# Patient Record
Sex: MALE | Race: WHITE | NOT HISPANIC OR LATINO | Employment: OTHER | URBAN - METROPOLITAN AREA
[De-identification: names, ages, dates, MRNs, and addresses within clinical notes are randomized per-mention and may not be internally consistent; named-entity substitution may affect disease eponyms.]

---

## 2022-01-13 NOTE — PROGRESS NOTES
Subjective   Patient ID: Frandy Perkins is a 81 y.o. male is being seen for consultation today at the request of Jessica Forrest MD for lumbar spinal stenosis.  MRI lumbar/xray lumbar done on 4/16/21.    Patient reports today low back pain that radiates down both legs to his feet.    The patient has a long and chronic history of pain dating back to 10 years in his back and his legs.  He underwent 3 surgeries, all by Dr. Damon.  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  He said he recalls being weak in both legs and having back pain and he thinks the surgery helped him until about 3 years later when apparently he went on to have a decompression and fusion at L2-L3.  This only helped for about a year and then he had another left L5-S1 decompression.  That was in 2017 and he has not seen Dr. Damon since.  He has mostly back pain and some hip and anterior thigh pain.  He has to lean forward when he walks and he has a positive shopping cart sign consistent with neurogenic claudication.  He has not had any recent postoperative studies.  He has not seen Dr. Damon since 2017.  He used to work with Dr. Brown before he ever had surgery and had blocks.  He used to be seen at [facility] pain management before and shut down.  He is not taking narcotic medications.  He is not taking any blood thinners.  There is a report from an MRI done in 2015 of some arachnoiditis but there is really nothing new to review.  I told him in order for me to give my opinion I need to get a recent set of pictures.  So I recommended a myelogram of the lumbar spine and flexion-extension films with follow-up with me in the office to discuss the findings.  He has had epidural blocks before but never an ablation and that might be something to consider particularly if his back is bothering him the most.    We will discuss these issues when he returns.    Back  Pain  The pain is present in the lumbar spine. The quality of the pain is described as aching. The pain radiates to the left knee, left foot, left thigh, right knee, right foot and right thigh. The pain is at a severity of 7/10. The pain is worse during the day. The symptoms are aggravated by standing (walking). Associated symptoms include leg pain, numbness and tingling. Pertinent negatives include no fever. He has tried bed rest and analgesics for the symptoms. The treatment provided mild relief.       The following portions of the patient's history were reviewed and updated as appropriate: allergies, current medications, past family history, past medical history, past social history, past surgical history and problem list.    Review of Systems   Constitutional: Negative for chills, fatigue and fever.   HENT: Negative for congestion.    Musculoskeletal: Positive for back pain.   Neurological: Positive for tingling and numbness.   All other systems reviewed and are negative.      Objective   Physical Exam  Constitutional:       Appearance: He is well-developed.   HENT:      Head: Normocephalic and atraumatic.   Eyes:      Extraocular Movements: EOM normal.      Conjunctiva/sclera: Conjunctivae normal.      Pupils: Pupils are equal, round, and reactive to light.   Neck:      Vascular: No carotid bruit.   Neurological:      Mental Status: He is oriented to person, place, and time.      Coordination: Finger-Nose-Finger Test and Heel to Shin Test normal.      Gait: Gait is intact.      Deep Tendon Reflexes:      Reflex Scores:       Tricep reflexes are 2+ on the right side and 2+ on the left side.       Bicep reflexes are 2+ on the right side and 2+ on the left side.       Brachioradialis reflexes are 2+ on the right side and 2+ on the left side.       Patellar reflexes are 2+ on the right side and 2+ on the left side.       Achilles reflexes are 2+ on the right side and 2+ on the left side.  Psychiatric:          Speech: Speech normal.       Neurologic Exam     Mental Status   Oriented to person, place, and time.   Registration of memory: Good recent and remote memory.   Attention: normal. Concentration: normal.   Speech: speech is normal   Level of consciousness: alert  Knowledge: consistent with education.     Cranial Nerves     CN II   Visual fields full to confrontation.   Visual acuity: normal    CN III, IV, VI   Pupils are equal, round, and reactive to light.  Extraocular motions are normal.     CN V   Facial sensation intact.   Right corneal reflex: normal  Left corneal reflex: normal    CN VII   Facial expression full, symmetric.   Right facial weakness: none  Left facial weakness: none    CN VIII   Hearing: intact    CN IX, X   Palate: symmetric    CN XI   Right sternocleidomastoid strength: normal  Left sternocleidomastoid strength: normal    CN XII   Tongue: not atrophic  Tongue deviation: none    Motor Exam   Muscle bulk: normal  Right arm tone: normal  Left arm tone: normal  Right leg tone: normal  Left leg tone: normal    Strength   Strength 5/5 except as noted.     Sensory Exam   Light touch normal.     Gait, Coordination, and Reflexes     Gait  Gait: normal    Coordination   Finger to nose coordination: normal  Heel to shin coordination: normal    Reflexes   Right brachioradialis: 2+  Left brachioradialis: 2+  Right biceps: 2+  Left biceps: 2+  Right triceps: 2+  Left triceps: 2+  Right patellar: 2+  Left patellar: 2+  Right achilles: 2+  Left achilles: 2+  Right : 2+  Left : 2+      Assessment/Plan   Independent Review of Radiographic Studies:      I reviewed plain x-rays done in 2019 which show pedicle screw fixation at L2-L3 and multilevel degenerative disc disease.  Agree with the report.      Medical Decision Making:      We will move forward with a lumbar myelogram with flexion-extension films.  This is the best way to get an accurate picture of the spine and the nerves.  He is aware of the risk  of a spinal headache and the potential need for blood patch.  We will discuss his options when he returns to discuss the study.      Diagnoses and all orders for this visit:    1. DDD (degenerative disc disease), lumbar (Primary)  -     IR Myelogram Lumbar Spine; Future  -     CT Lumbar Spine With Intrathecal Contrast; Future  -     XR Spine Lumbar Complete With Flex & Ext; Future    2. Spinal stenosis of lumbar region with neurogenic claudication  -     IR Myelogram Lumbar Spine; Future  -     CT Lumbar Spine With Intrathecal Contrast; Future  -     XR Spine Lumbar Complete With Flex & Ext; Future    3. History of spinal fusion  -     IR Myelogram Lumbar Spine; Future  -     CT Lumbar Spine With Intrathecal Contrast; Future  -     XR Spine Lumbar Complete With Flex & Ext; Future      Return in about 3 weeks (around 2/7/2022) for After myelogram and x-rays.

## 2022-01-13 NOTE — H&P (VIEW-ONLY)
Subjective   Patient ID: Frandy Perkins is a 81 y.o. male is being seen for consultation today at the request of Jessica Forrest MD for lumbar spinal stenosis.  MRI lumbar/xray lumbar done on 4/16/21.    Patient reports today low back pain that radiates down both legs to his feet.    The patient has a long and chronic history of pain dating back to 10 years in his back and his legs.  He underwent 3 surgeries, all by Dr. Damon.  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  He said he recalls being weak in both legs and having back pain and he thinks the surgery helped him until about 3 years later when apparently he went on to have a decompression and fusion at L2-L3.  This only helped for about a year and then he had another left L5-S1 decompression.  That was in 2017 and he has not seen Dr. Damon since.  He has mostly back pain and some hip and anterior thigh pain.  He has to lean forward when he walks and he has a positive shopping cart sign consistent with neurogenic claudication.  He has not had any recent postoperative studies.  He has not seen Dr. Damon since 2017.  He used to work with Dr. Brown before he ever had surgery and had blocks.  He used to be seen at [facility] pain management before and shut down.  He is not taking narcotic medications.  He is not taking any blood thinners.  There is a report from an MRI done in 2015 of some arachnoiditis but there is really nothing new to review.  I told him in order for me to give my opinion I need to get a recent set of pictures.  So I recommended a myelogram of the lumbar spine and flexion-extension films with follow-up with me in the office to discuss the findings.  He has had epidural blocks before but never an ablation and that might be something to consider particularly if his back is bothering him the most.    We will discuss these issues when he returns.    Back  Pain  The pain is present in the lumbar spine. The quality of the pain is described as aching. The pain radiates to the left knee, left foot, left thigh, right knee, right foot and right thigh. The pain is at a severity of 7/10. The pain is worse during the day. The symptoms are aggravated by standing (walking). Associated symptoms include leg pain, numbness and tingling. Pertinent negatives include no fever. He has tried bed rest and analgesics for the symptoms. The treatment provided mild relief.       The following portions of the patient's history were reviewed and updated as appropriate: allergies, current medications, past family history, past medical history, past social history, past surgical history and problem list.    Review of Systems   Constitutional: Negative for chills, fatigue and fever.   HENT: Negative for congestion.    Musculoskeletal: Positive for back pain.   Neurological: Positive for tingling and numbness.   All other systems reviewed and are negative.      Objective   Physical Exam  Constitutional:       Appearance: He is well-developed.   HENT:      Head: Normocephalic and atraumatic.   Eyes:      Extraocular Movements: EOM normal.      Conjunctiva/sclera: Conjunctivae normal.      Pupils: Pupils are equal, round, and reactive to light.   Neck:      Vascular: No carotid bruit.   Neurological:      Mental Status: He is oriented to person, place, and time.      Coordination: Finger-Nose-Finger Test and Heel to Shin Test normal.      Gait: Gait is intact.      Deep Tendon Reflexes:      Reflex Scores:       Tricep reflexes are 2+ on the right side and 2+ on the left side.       Bicep reflexes are 2+ on the right side and 2+ on the left side.       Brachioradialis reflexes are 2+ on the right side and 2+ on the left side.       Patellar reflexes are 2+ on the right side and 2+ on the left side.       Achilles reflexes are 2+ on the right side and 2+ on the left side.  Psychiatric:          Speech: Speech normal.       Neurologic Exam     Mental Status   Oriented to person, place, and time.   Registration of memory: Good recent and remote memory.   Attention: normal. Concentration: normal.   Speech: speech is normal   Level of consciousness: alert  Knowledge: consistent with education.     Cranial Nerves     CN II   Visual fields full to confrontation.   Visual acuity: normal    CN III, IV, VI   Pupils are equal, round, and reactive to light.  Extraocular motions are normal.     CN V   Facial sensation intact.   Right corneal reflex: normal  Left corneal reflex: normal    CN VII   Facial expression full, symmetric.   Right facial weakness: none  Left facial weakness: none    CN VIII   Hearing: intact    CN IX, X   Palate: symmetric    CN XI   Right sternocleidomastoid strength: normal  Left sternocleidomastoid strength: normal    CN XII   Tongue: not atrophic  Tongue deviation: none    Motor Exam   Muscle bulk: normal  Right arm tone: normal  Left arm tone: normal  Right leg tone: normal  Left leg tone: normal    Strength   Strength 5/5 except as noted.     Sensory Exam   Light touch normal.     Gait, Coordination, and Reflexes     Gait  Gait: normal    Coordination   Finger to nose coordination: normal  Heel to shin coordination: normal    Reflexes   Right brachioradialis: 2+  Left brachioradialis: 2+  Right biceps: 2+  Left biceps: 2+  Right triceps: 2+  Left triceps: 2+  Right patellar: 2+  Left patellar: 2+  Right achilles: 2+  Left achilles: 2+  Right : 2+  Left : 2+      Assessment/Plan   Independent Review of Radiographic Studies:      I reviewed plain x-rays done in 2019 which show pedicle screw fixation at L2-L3 and multilevel degenerative disc disease.  Agree with the report.      Medical Decision Making:      We will move forward with a lumbar myelogram with flexion-extension films.  This is the best way to get an accurate picture of the spine and the nerves.  He is aware of the risk  of a spinal headache and the potential need for blood patch.  We will discuss his options when he returns to discuss the study.      Diagnoses and all orders for this visit:    1. DDD (degenerative disc disease), lumbar (Primary)  -     IR Myelogram Lumbar Spine; Future  -     CT Lumbar Spine With Intrathecal Contrast; Future  -     XR Spine Lumbar Complete With Flex & Ext; Future    2. Spinal stenosis of lumbar region with neurogenic claudication  -     IR Myelogram Lumbar Spine; Future  -     CT Lumbar Spine With Intrathecal Contrast; Future  -     XR Spine Lumbar Complete With Flex & Ext; Future    3. History of spinal fusion  -     IR Myelogram Lumbar Spine; Future  -     CT Lumbar Spine With Intrathecal Contrast; Future  -     XR Spine Lumbar Complete With Flex & Ext; Future      Return in about 3 weeks (around 2/7/2022) for After myelogram and x-rays.

## 2022-01-17 ENCOUNTER — OFFICE VISIT (OUTPATIENT)
Dept: NEUROSURGERY | Facility: CLINIC | Age: 82
End: 2022-01-17

## 2022-01-17 VITALS
DIASTOLIC BLOOD PRESSURE: 70 MMHG | HEART RATE: 85 BPM | TEMPERATURE: 97.5 F | HEIGHT: 70 IN | BODY MASS INDEX: 30.55 KG/M2 | SYSTOLIC BLOOD PRESSURE: 108 MMHG | WEIGHT: 213.4 LBS | OXYGEN SATURATION: 96 %

## 2022-01-17 DIAGNOSIS — Z98.1 HISTORY OF SPINAL FUSION: ICD-10-CM

## 2022-01-17 DIAGNOSIS — M48.062 SPINAL STENOSIS OF LUMBAR REGION WITH NEUROGENIC CLAUDICATION: ICD-10-CM

## 2022-01-17 DIAGNOSIS — M51.36 DDD (DEGENERATIVE DISC DISEASE), LUMBAR: Primary | ICD-10-CM

## 2022-01-17 PROCEDURE — 99204 OFFICE O/P NEW MOD 45 MIN: CPT | Performed by: NEUROLOGICAL SURGERY

## 2022-01-17 RX ORDER — SILDENAFIL 100 MG/1
100 TABLET, FILM COATED ORAL
COMMUNITY
End: 2022-12-15

## 2022-01-17 RX ORDER — MELOXICAM 15 MG/1
7.5 TABLET ORAL 2 TIMES DAILY
COMMUNITY
Start: 2021-12-22

## 2022-01-17 RX ORDER — ACETAMINOPHEN 325 MG/1
650 TABLET ORAL
COMMUNITY

## 2022-01-17 RX ORDER — SIMVASTATIN 10 MG
10 TABLET ORAL NIGHTLY
COMMUNITY
Start: 2021-12-22

## 2022-01-17 RX ORDER — VERAPAMIL HYDROCHLORIDE 240 MG/1
240 TABLET, FILM COATED, EXTENDED RELEASE ORAL NIGHTLY
COMMUNITY
Start: 2021-12-22

## 2022-01-17 RX ORDER — GABAPENTIN 600 MG/1
600 TABLET ORAL 3 TIMES DAILY
COMMUNITY
Start: 2021-03-29

## 2022-01-17 RX ORDER — LISINOPRIL 20 MG/1
20 TABLET ORAL DAILY
COMMUNITY
Start: 2021-07-06

## 2022-01-17 RX ORDER — HYDROCHLOROTHIAZIDE 25 MG/1
25 TABLET ORAL DAILY
COMMUNITY
Start: 2021-12-22

## 2022-01-19 NOTE — PROGRESS NOTES
01/24/22 0002   Pre-Procedure Phone Call   Procedure Time Verified Yes   Arrival Time 1200   Procedure Location Verified Yes   Medical History Reviewed No   NPO Status Reinforced Yes   Ride and Caregiver Arranged Yes   Patient Knows to Bring Current Medications No   Bring Outside Films Requested Yes  (Patient stated his last MRI was done at [facility] in 2017. PEF sent to film library.)

## 2022-01-21 ENCOUNTER — PATIENT ROUNDING (BHMG ONLY) (OUTPATIENT)
Dept: NEUROSURGERY | Facility: CLINIC | Age: 82
End: 2022-01-21

## 2022-01-24 ENCOUNTER — HOSPITAL ENCOUNTER (OUTPATIENT)
Dept: CT IMAGING | Facility: HOSPITAL | Age: 82
Discharge: HOME | End: 2022-01-24

## 2022-01-24 ENCOUNTER — HOSPITAL ENCOUNTER (OUTPATIENT)
Dept: GENERAL RADIOLOGY | Facility: HOSPITAL | Age: 82
Discharge: HOME | End: 2022-01-24

## 2022-01-24 VITALS
RESPIRATION RATE: 18 BRPM | WEIGHT: 205 LBS | OXYGEN SATURATION: 98 % | TEMPERATURE: 98.2 F | HEART RATE: 73 BPM | BODY MASS INDEX: 29.35 KG/M2 | SYSTOLIC BLOOD PRESSURE: 118 MMHG | DIASTOLIC BLOOD PRESSURE: 71 MMHG | HEIGHT: 70 IN

## 2022-01-24 DIAGNOSIS — Z98.1 HISTORY OF SPINAL FUSION: ICD-10-CM

## 2022-01-24 DIAGNOSIS — M48.062 SPINAL STENOSIS OF LUMBAR REGION WITH NEUROGENIC CLAUDICATION: ICD-10-CM

## 2022-01-24 DIAGNOSIS — M51.36 DDD (DEGENERATIVE DISC DISEASE), LUMBAR: ICD-10-CM

## 2022-01-24 PROCEDURE — 62304 MYELOGRAPHY LUMBAR INJECTION: CPT

## 2022-01-24 PROCEDURE — 0 IOPAMIDOL 41 % SOLUTION: Performed by: RADIOLOGY

## 2022-01-24 PROCEDURE — 0 LIDOCAINE 1 % SOLUTION: Performed by: RADIOLOGY

## 2022-01-24 PROCEDURE — 62284 INJECTION FOR MYELOGRAM: CPT

## 2022-01-24 PROCEDURE — 72114 X-RAY EXAM L-S SPINE BENDING: CPT

## 2022-01-24 PROCEDURE — 72240 MYELOGRAPHY NECK SPINE: CPT

## 2022-01-24 PROCEDURE — 72132 CT LUMBAR SPINE W/DYE: CPT

## 2022-01-24 RX ORDER — LIDOCAINE HYDROCHLORIDE 10 MG/ML
10 INJECTION, SOLUTION INFILTRATION; PERINEURAL ONCE
Status: COMPLETED | OUTPATIENT
Start: 2022-01-24 | End: 2022-01-24

## 2022-01-24 RX ORDER — SODIUM CHLORIDE 0.9 % (FLUSH) 0.9 %
3 SYRINGE (ML) INJECTION EVERY 12 HOURS SCHEDULED
Status: CANCELLED | OUTPATIENT
Start: 2022-01-24

## 2022-01-24 RX ORDER — SODIUM CHLORIDE 0.9 % (FLUSH) 0.9 %
10 SYRINGE (ML) INJECTION AS NEEDED
Status: CANCELLED | OUTPATIENT
Start: 2022-01-24

## 2022-01-24 RX ADMIN — IOPAMIDOL 17 ML: 408 INJECTION, SOLUTION INTRATHECAL at 13:35

## 2022-01-24 RX ADMIN — LIDOCAINE HYDROCHLORIDE 5.5 ML: 10 INJECTION, SOLUTION INFILTRATION; PERINEURAL at 13:32

## 2022-01-24 NOTE — DISCHARGE INSTRUCTIONS
EDUCATION /DISCHARGE INSTRUCTIONS:  A myelogram is a special radiology procedure of the spinal cord, spinal nerves and other related structures.  You will be awake during the examination.  An area of your lower back will be cleansed with an antiseptic solution.  The physician will inject a numbing medication in your lower back.  While your back is numb, a needle will be placed in the lower back area.  A small amount of spinal fluid may be withdrawn and sent to the lab if ordered by your physician. While the needle is in the back, an injection of a contrast material (xray dye) will be given through the needle.  The contrast material will allow the physician to see the spinal cord and spinal nerves.  Once injected, the needle will be removed and a band aid will be placed over the injection site.  The table will be tilted during the process to allow the contrast material to flow to particular areas in the spine.  Following the injection and xrays, you will be taken to the CT scan where more pictures will be taken. After the procedure is finished, the contrast material will be absorbed by your body and eliminated through your kidneys.  The radiologist will study and interpret your myelogram and send the results to your physician.  Procedure risks of a myelogram include, but are not limited to:  *  Bleeding   *  Seizure  *  Infection   *  Headache, possibly severe requiring a blood patch  *  Contrast reaction  *  Nerve or cord injury  *  Paralysis and death    Benefits of the procedure:    Best examination for delineating pathology related to spinal cord compression from a disc and/or nerve root compression  Alternatives to the procedure:MRI - a non invasive procedure requiring intravenous contrast injection. Cannot be done on patients with certain pacemakers or metal in the body.  MRI risks include possible reaction to the contrast material, movement of metal located in the body.   Benefit to MRI:  Non-invasive and  usually painless procedure.  THIS EDUCATION INFORMATION WAS REVIEWED PRIOR TO THE PROCEDURE AND CONSENT. Patient initials __________________Time_________________      Important information following your myelogram:    *  When you get home, lie down with no more than 2 pillows under your head.  *  Sit up in the car going home. At home, sit up to eat and use the restroom only, then lie back down.   *  24 HOURS COMPLETE AT ________________________   *  Tomorrow, after 24 hours completed, take it easy and rest the next 2-3 days.  *  Do not drive for 24 hours following a myelogram  *  You may remove the bandage and shower in the morning  *  Increase your fluids for the next 24 hours.  Caffeinated drinks are encouraged.Increase your intake of fluids the next 2-3 days    Resume taking your blood thinner or Aspirin on ___N/A______________________      CALL YOUR PHYSICIAN FOR THE FOLLOWING:  * Pain at the injection site  * Redness, swelling, bruising or drainage at the injection site  * A fever by mouth of 101.0  * Any new symptoms  If you have problems with a headache that is not relieved with rest and medication, please call the Radiology Triage Nurse desk [phone]

## 2022-01-24 NOTE — NURSING NOTE
"Pt post myelogram.  States he had \"fire\" run down his legs.  Enjoying a bite to eat and something to drink.   "

## 2022-01-24 NOTE — NURSING NOTE
Pt arrived for his procedure.  Protective goggles and mask in place with all patient interactions today

## 2022-03-02 ENCOUNTER — OFFICE VISIT (OUTPATIENT)
Dept: NEUROSURGERY | Facility: CLINIC | Age: 82
End: 2022-03-02

## 2022-03-02 VITALS
TEMPERATURE: 98 F | OXYGEN SATURATION: 97 % | WEIGHT: 205 LBS | HEART RATE: 82 BPM | BODY MASS INDEX: 29.35 KG/M2 | SYSTOLIC BLOOD PRESSURE: 144 MMHG | DIASTOLIC BLOOD PRESSURE: 70 MMHG | HEIGHT: 70 IN

## 2022-03-02 DIAGNOSIS — M48.062 SPINAL STENOSIS OF LUMBAR REGION WITH NEUROGENIC CLAUDICATION: ICD-10-CM

## 2022-03-02 DIAGNOSIS — Z98.1 HISTORY OF SPINAL FUSION: ICD-10-CM

## 2022-03-02 DIAGNOSIS — M51.36 DDD (DEGENERATIVE DISC DISEASE), LUMBAR: Primary | ICD-10-CM

## 2022-03-02 PROCEDURE — 99214 OFFICE O/P EST MOD 30 MIN: CPT | Performed by: NEUROLOGICAL SURGERY

## 2022-03-02 RX ORDER — CHOLECALCIFEROL (VITAMIN D3) 125 MCG
5000 CAPSULE ORAL DAILY
COMMUNITY

## 2022-03-07 ENCOUNTER — TELEPHONE (OUTPATIENT)
Dept: NEUROSURGERY | Facility: CLINIC | Age: 82
End: 2022-03-07

## 2022-03-07 DIAGNOSIS — Z98.1 HISTORY OF SPINAL FUSION: ICD-10-CM

## 2022-03-07 DIAGNOSIS — M48.062 SPINAL STENOSIS OF LUMBAR REGION WITH NEUROGENIC CLAUDICATION: ICD-10-CM

## 2022-03-07 DIAGNOSIS — M51.36 DDD (DEGENERATIVE DISC DISEASE), LUMBAR: Primary | ICD-10-CM

## 2022-03-07 NOTE — TELEPHONE ENCOUNTER
Caller: Frandy Perkins    Relationship to patient: Self    Best call back number: [phone]    Patient is needing: PATIENT CALLED TO STATE HE WOULD LIKE THE REFERRAL FOR PAIN MANAGEMENT FOR THE ABLATION TO BE DISCUSSED, PLEASE CALL THE PATIENT TO ADVISE.    THANK YOU

## 2022-03-09 ENCOUNTER — HOSPITAL ENCOUNTER (OUTPATIENT)
Dept: PHYSICAL THERAPY | Facility: HOSPITAL | Age: 82
Setting detail: THERAPIES SERIES
Discharge: HOME | End: 2022-03-09

## 2022-03-09 DIAGNOSIS — M51.36 DDD (DEGENERATIVE DISC DISEASE), LUMBAR: Primary | ICD-10-CM

## 2022-03-09 DIAGNOSIS — M48.062 SPINAL STENOSIS OF LUMBAR REGION WITH NEUROGENIC CLAUDICATION: ICD-10-CM

## 2022-03-09 DIAGNOSIS — Z98.1 HX OF SPINAL FUSION: ICD-10-CM

## 2022-03-09 PROCEDURE — 97161 PT EVAL LOW COMPLEX 20 MIN: CPT

## 2022-03-09 PROCEDURE — 97110 THERAPEUTIC EXERCISES: CPT

## 2022-03-09 NOTE — TELEPHONE ENCOUNTER
I called and LVM for patient to let him know that I have placed the pain management order to Dr. Tyler's office. They will contact him within 3 business days.

## 2022-03-09 NOTE — THERAPY EVALUATION
Outpatient Physical Therapy Ortho Initial Evaluation  AJ Sánchez     Patient Name: Frandy Perkins  : 1940  MRN: [id number]  Today's Date: 3/9/2022      Visit Date: 2022    Patient Active Problem List   Diagnosis   • DDD (degenerative disc disease), lumbar   • Spinal stenosis of lumbar region with neurogenic claudication   • History of spinal fusion        Past Medical History:   Diagnosis Date   • Arthritis    • Hypertension         Past Surgical History:   Procedure Laterality Date   • BACK SURGERY      hx of 3 back surgeries   • ORTHOPEDIC SURGERY         Visit Dx:     ICD-10-CM ICD-9-CM   1. DDD (degenerative disc disease), lumbar  M51.36 722.52   2. Spinal stenosis of lumbar region with neurogenic claudication  M48.062 724.03   3. Hx of spinal fusion  Z98.1 V45.4          Patient History     Row Name 22 0900             History    Chief Complaint Balance Problems;Difficulty Walking;Difficulty with daily activities;Joint stiffness;Muscle tenderness;Muscle weakness;Pain;Tightness  -LN      Type of Pain Back pain;Hip pain  Bilateral  -LN      Brief Description of Current Complaint Patient reports hx of back pain for years; had a myelogram in January and showed Postoperative and multilevel lumbar degenerative disease  including an element of spinal canal stenosis at multiple levels, worst at L1,L2 level- adjacent level stenosis at L1-L2 which is quite severe per MD order. Patient c/o constant pain in B LB and into B hips and occasionally into lateral thighs. No c/o any N/T except occasionally reported in legs if he sits too long. Patient has a referral to pain management to see about getting an ablation; no appointment scheduled yet. Patient referred for outpatient PT for core and LE strengthening.  -LN      Patient/Caregiver Goals Relieve pain;Return to prior level of function;Improve mobility;Improve strength;Know what to do to help the symptoms  -LN      Patient/Caregiver Goals  "Comment \"to get a little stronger in his back and legs\" per wife  -LN      Hand Dominance right-handed  -LN      Occupation/sports/leisure activities retired; likes to watch tv and do yard work  -LN      Patient seeing anyone else for problem(s)? Carlos Nair  Neurosurgeon; has referral to pain management  -LN      How has patient tried to help current problem? HP; tylenol; ambulates with SPC as needed  -LN      What clinical tests have you had for this problem? X-ray;CT scan;Myelogram  -LN      Results of Clinical Tests per myelogram- Postoperative and multilevel lumbar degenerative disease  including an element of spinal canal stenosis at multiple levels. Please  see imaging for full discussion. Worst level is at L1-L2 per MD note.  -LN      Related/Recent Hospitalizations No  -LN      Surgery/Hospitalization 3 total back surgeries; most recently in 2017  -LN      History of Previous Related Injuries no injuries reported  -LN              Pain     Pain Location Back;Hip  Bilateral  -LN      Pain at Present 7  -LN      Pain at Best 2  -LN      Pain at Worst 10  -LN      Pain Frequency Constant/continuous  -LN      Pain Description Aching;Sharp;Shooting  occas shooting pain  -LN      What Performance Factors Make the Current Problem(s) WORSE? walking; standing is the worst; trunk ROM; antonella extension  -LN      What Performance Factors Make the Current Problem(s) BETTER? sitting in recliner; in bed  -LN      Tolerance Time- Standing very limited  -LN      Tolerance Time- Sitting okay in recliner  -LN      Tolerance Time- Walking limited  -LN      Tolerance Time- Lying good tolerance- feels better when in bed  -LN      Is your sleep disturbed? No  -LN      What position do you sleep in? Right sidelying;Left sidelying  -LN      Difficulties at work? retired  -LN      Difficulties with ADL's? can do all- but has to hold onto something to steady him to put pants on  -LN      Difficulties with recreational activities? " yes  -LN              Fall Risk Assessment    Any falls in the past year: Yes  -LN      Number of falls reported in the last 12 months 2  -LN      Factors that contributed to the fall: Lost balance;Slippery surface;Tripped  -LN              Services    Prior Rehab/Home Health Experiences Yes  -LN      When was the prior experience with Rehab/Home Health years ago and after last surgery  -LN      Where was the prior experience with Rehab/Home Health HH PT after surgery; [facility] in [place] prior to surgeries  -LN      Are you currently receiving Home Health services No  -LN      Do you plan to receive Home Health services in the near future No  -LN              Daily Activities    Primary Language English  -LN      How does patient learn best? Listening;Reading;Demonstration  -LN      Teaching needs identified Home Exercise Program;Management of Condition;Other (comment)  Risks & benefits of treatment explained to patient and wife.  -LN      Patient is concerned about/has problems with Difficulty with self care (i.e. bathing, dressing, toileting:;Flexibility;Performing home management (household chores, shopping, care of dependents);Performing job responsibilities/community activities (work, school,;Performing sports, recreation, and play activities;Standing;Walking  -LN      Does patient have problems with the following? None  -LN      Barriers to learning None  -LN      Pt Participated in POC and Goals Yes  -LN              Safety    Are you being hurt, hit, or frightened by anyone at home or in your life? No  -LN      Are you being neglected by a caregiver No  -LN      Have you had any of the following issues with N/A  -LN            User Key  (r) = Recorded By, (t) = Taken By, (c) = Cosigned By    Initials Name Provider Type    Judy Ventura PT Physical Therapist                 PT Ortho     Row Name 03/09/22 0900       Subjective Comments    Subjective Comments Pt c/o weakness in back and legs;  "\"I'm always looking for a place to sit down.\" He uses a cane if he has to walk a distance. \" I probably don't use it as often as I should.\" He does report having a fall yesterday in the barn. \"My ultimate goal is to be able to walk the bridge between [place] and [place]; it is about a mile long.\"  -LN       Precautions and Contraindications    Precautions hx of 3 lumbar surgeries; most recent in 2017  -LN       Subjective Pain    Able to rate subjective pain? yes  -LN    Pre-Treatment Pain Level 7  -LN    Subjective Pain Comment No c/o any increased pain after session  -LN       Posture/Observations    Forward Head Moderate  -LN    Rounded Shoulders Moderate;Bilateral:  -LN    Iliac crests Right:;Elevated;Standing posture  -LN    Posture/Observations Comments Patient stands and walks in significant FF posture- about 50-60 degrees trunk flexion  Pt reports that he has walked bent over since his last surgery  -LN       Myotomal Screen- Lower Quarter Clearing    Hip flexion (L2) Right:;4+ (Good +);Left:;5 (Normal)  -LN    Knee extension (L3) Bilateral:;4+ (Good +)  -LN    Ankle DF (L4) Right:;4 (Good);Left:;4+ (Good +)  -LN    Ankle PF (S1) Right:;4 (Good);Left:;4+ (Good +)  -LN    Knee flexion (S2) Bilateral:;4+ (Good +)  -LN       SI/Hip Screen- Lower Quarter Clearing    ASIS compression Bilateral:;Positive  -LN    ASIS distraction Bilateral:;Positive  -LN       Lumbar/SI Special Tests    Stork Test (SI Dysfunction) Right:;Positive  -LN    SLR (Neural Tension) Bilateral:;Positive  -LN       Lumbosacral Palpation    SI Bilateral:;Tender  -LN    Lumbosacral Segment Tender  antonella L1-L2 level  -LN    Erector Spinae (Paraspinals) Bilateral:;Tender;Guarded/taut  -LN    Hamstring Bilateral:;Tender;Guarded/taut  -LN       Leg Length Test    True Equal  -LN    Apparent Unequal;Right Higher Leg  sx of pelvic obliquity  -LN       General ROM    GENERAL ROM COMMENTS B LE AROM grossly WFL-WNL.  -LN       Head/Neck/Trunk    Trunk " Extension AROM -10 degrees  unable to stand completely upright  -LN    Trunk Flexion AROM WFL- pain  -LN    Trunk Lt Lateral Flexion AROM 75%-discomfort  -LN    Trunk Rt Lateral Flexion AROM 75%-discomfort  -LN    Trunk Lt Rotation AROM 75%-discomfort  -LN    Trunk Rt Rotation AROM 75%-discomfort  -LN       MMT Neck/Trunk    Trunk Flexion MMT, Gross Movement (4-/5) good minus  -LN    Trunk Extension MMT, Gross Movement (3-/5) fair minus  -LN       MMT Right Lower Ext    Rt Hip Flexion MMT, Gross Movement (5/5) normal  -LN    Rt Hip Extension MMT, Gross Movement (5/5) normal  -LN    Rt Hip ABduction MMT, Gross Movement (4/5) good  -LN    Rt Hip ADduction MMT, Gross Movement (4/5) good  -LN    Rt Knee Extension MMT, Gross Movement (4+/5) good plus  -LN    Rt Knee Flexion MMT, Gross Movement (5/5) normal  -LN    Rt Ankle Plantarflexion MMT, Gross Movement (4/5) good  -LN    Rt Ankle Dorsiflexion MMT, Gross Movement (4/5) good  eversion 4/5  -LN       MMT Left Lower Ext    Lt Hip Flexion MMT, Gross Movement (4+/5) good plus  -LN    Lt Hip Extension MMT, Gross Movement (5/5) normal  -LN    Lt Hip ABduction MMT, Gross Movement (4/5) good  -LN    Lt Hip ADduction MMT, Gross Movement (4/5) good  -LN    Lt Knee Extension MMT, Gross Movement (4+/5) good plus  -LN    Lt Knee Flexion MMT, Gross Movement (5/5) normal  -LN    Lt Ankle Plantarflexion MMT, Gross Movement (4+/5) good plus  -LN    Lt Ankle Dorsiflexion MMT, Gross Movement (4+/5) good plus  eversion 4/5  -LN       Sensation    Sensation WNL? WNL  -LN    Light Touch No apparent deficits  -LN       Lower Extremity Flexibility    Hamstrings Bilateral:;Moderately limited  -LN    ITB Bilateral:;Moderately limited  -LN    Gastrocnemius Bilateral:;Mildly limited  -LN    Soleus Bilateral:;Mildly limited  -LN       Transfers    Sit-Stand [place] (Transfers) independent  -LN    Stand-Sit [place] (Transfers) independent  -LN    Transfers, Sit-Stand-Sit, Assist Device  straight cane  -LN       Gait/Stairs (Locomotion)    Dillon Level (Gait) independent  -LN    Assistive Device (Gait) cane, straight  -LN    Deviations/Abnormal Patterns (Gait) bilateral deviations;antalgic;colleen decreased;gait speed decreased  -LN    Bilateral Gait Deviations forward flexed posture  significant FF posture noted  -LN    Comment, (Gait/Stairs) Patient ambulates with SPC Independently with significant FF posture and with hip and knee flexion. No LOB noted; decreased gait speed noted.  -LN          User Key  (r) = Recorded By, (t) = Taken By, (c) = Cosigned By    Initials Name Provider Type    Judy Ventura, PT Physical Therapist                            Therapy Education  Education Details: Patient to work on HEP 1 x day and to use MH/CP as needed. Patient to work on more upright posture as tolerated with seated and standing exercises.  Given: HEP, Symptoms/condition management, Pain management, Posture/body mechanics  Program: New  How Provided: Verbal, Demonstration, Written  Provided to: Patient, Other (comment) (wife present)  Level of Understanding: Teach back education performed, Verbalized, Demonstrated      PT OP Goals     Row Name 03/09/22 0900          PT Short Term Goals    STG Date to Achieve 03/23/22  -LN     STG 1 Patient to verbally report decreased pain in low back and hips to <5/10 with ADLs and everyday activities.  -LN     STG 2 Patient independent with initial HEP issued by therapist.  -LN     STG 3 Trunk ROM improved by 25% to allow for improved functional mobility and gait.  -LN     STG 4 Patient able to stand with improved trunk extension; improved to only 20 degrees trunk flexion.  -LN     STG 5 Patient able to maintain good pelvic alignment between PT sessions with use of MET and core stabilization exercises.  -LN     STG 6 Patient to have improved standing tolerance to 20-30 minutes.  -LN            Long Term Goals    LTG Date to Achieve 04/06/22  -LN      LTG 1 Patient to verbally report decreased pain in low back and hips to <3/10 with ADLs and everyday activities.  -LN     LTG 2 Patient independent with more advanced HEP issued by therapist.  -LN     LTG 3 Patient able to stand with only 10 degrees or less trunk flexion with no c/o any increased LBP.  -LN     LTG 4 B LE strength improved to 5/5 all planes.  -LN     LTG 5 Core strength improved to 4/5 trunk flexion and 4- trunk extension.  -LN     LTG 6 Patient able to walk 1/2 mile without any increased LBP.  -LN            Time Calculation    PT Goal Re-Cert Due Date 04/06/22  -LN           User Key  (r) = Recorded By, (t) = Taken By, (c) = Cosigned By    Initials Name Provider Type    Judy Ventura, PT Physical Therapist                 PT Assessment/Plan     Row Name 03/09/22 0900          PT Assessment    Functional Limitations Impaired gait;Decreased safety during functional activities;Impaired locomotion;Limitation in home management;Limitations in community activities;Limitations in functional capacity and performance;Performance in leisure activities;Performance in self-care ADL  -LN     Impairments Balance;Gait;Impaired flexibility;Locomotion;Muscle strength;Pain;Posture;Range of motion  -LN     Assessment Comments Patient presents with hx of LBP for years and has had 3 lumbar surgeries; most recently in 2017. Patient presents with c/o constant pain in B LB and B hips and occassionally into B lateral thighs; decreased trunk ROM, antonella trunk extension, decreased core and B LE strength (antonella trunk extension;R hip flexion;B Quads; B hip abduction & adduction & B ankle df and pf); decreased flexibility B LE; sx of pelvic obliquities; decreased standing and walking tolerance. Patient stands and ambulates in significant FF posture; about 50-60 degrees trunk flexion. Patient with sx of multi-level lumbar DDD and stenosis. He will benefit from a good core and LE strengthening program with HEP; balance  "and gait activities as needed and modalities for pain relief.  -LN     Please refer to paper survey for additional self-reported information Yes  -LN     Patient/caregiver participated in establishment of treatment plan and goals Yes  with wife  -LN     Patient would benefit from skilled therapy intervention Yes  -LN            PT Plan    PT Frequency 2x/week  -LN     Predicted Duration of Therapy Intervention (PT) 4-6 weeks  -LN     Planned CPT's? PT EVAL LOW COMPLEXITY: 05071;PT THER PROC EA 15 MIN: 44179;PT MANUAL THERAPY EA 15 MIN: 44858;PT HOT OR COLD PACK TREAT [facility]ARE;PT ELECTRICAL STIM UNATTEND: ;PT GAIT TRAINING EA 15 MIN: 49611  -LN     Physical Therapy Interventions (Optional Details) home exercise program;lumbar stabilization;manual therapy techniques;modalities;patient/family education;postural re-education;ROM (Range of Motion);strengthening;stretching;taping  -LN     PT Plan Comments See patient 2 x week for therapeutic exercises/core stabilization/LE and core strengthening with HEP; MET PRN; modalities PRN (IFC/MH/CP); patient and posture education; gait training as needed; balance exercises. Kinesiotape PRN.  -LN           User Key  (r) = Recorded By, (t) = Taken By, (c) = Cosigned By    Initials Name Provider Type    LN Judy Wan, PT Physical Therapist                   OP Exercises     Row Name 03/09/22 0900             Subjective Comments    Subjective Comments Pt c/o weakness in back and legs; \"I'm always looking for a place to sit down.\" He uses a cane if he has to walk a distance. \" I probably don't use it as often as I should.\" He does report having a fall yesterday in the barn. \"My ultimate goal is to be able to walk the bridge between [place] and [place]; it is about a mile long.\"  -LN              Subjective Pain    Able to rate subjective pain? yes  -LN      Pre-Treatment Pain Level 7  -LN      Subjective Pain Comment No c/o any increased pain after session  -LN           "    Total Minutes    18499 - PT Therapeutic Exercise Minutes 15  -LN              Exercise 1    Exercise Name 1 PPT  -LN      Cueing 1 Verbal;Tactile;Demo  -LN      Reps 1 10  -LN      Time 1 5 sec  -LN              Exercise 2    Exercise Name 2 Hooklying ball squeeze  -LN      Cueing 2 Verbal;Tactile;Demo  -LN      Reps 2 10  -LN      Time 2 5 sec  -LN              Exercise 3    Exercise Name 3 Supine clam shell vs TB  -LN      Cueing 3 Verbal;Tactile;Demo  -LN      Reps 3 10  -LN      Time 3 5 sec  -LN      Additional Comments Black TB  -LN              Exercise 4    Exercise Name 4 seated hip flexion with stomach muscles tight.  -LN      Cueing 4 Verbal;Tactile;Demo  -LN      Reps 4 10  -LN      Time 4 2 sec  -LN              Exercise 5    Exercise Name 5 LAQ  -LN      Cueing 5 Verbal;Tactile;Demo  -LN      Reps 5 10 each  -LN      Time 5 2 sec  -LN              Exercise 6    Exercise Name 6 standing hip extension  -LN      Cueing 6 Verbal;Tactile;Demo  -LN      Reps 6 10 each  -LN      Time 6 2 sec  -LN      Additional Comments at elevated table for support; cueing needed to try and stand more upright and not bend forward more with leg movement and to not bring leg back too far.  -LN            User Key  (r) = Recorded By, (t) = Taken By, (c) = Cosigned By    Initials Name Provider Type    Judy Ventura, PT Physical Therapist              Manual Rx (last 36 hours)     Manual Treatments     Row Name 03/09/22 0900             Manual Rx 1    Manual Rx 1 Location pelvis  -LN      Manual Rx 1 Type MET: shotgun/right anterior innominate/left pelvic outflare  -LN      Manual Rx 1 Duration Improved pelvic alignment noted after MET.  -LN            User Key  (r) = Recorded By, (t) = Taken By, (c) = Cosigned By    Initials Name Provider Type    Judy Ventura, PT Physical Therapist                            Outcome Measure Options: Other Outcome Measure  Other Outcome Measure Tool Used  Other  Outcome Measure Tool Comments: Back index- score of 23 today.      Time Calculation:     Start Time: 0905  Stop Time: 1000  Time Calculation (min): 55 min  Timed Charges  38474 - PT Therapeutic Exercise Minutes: 15  Total Minutes  Timed Charges Total Minutes: 15   Total Minutes: 15     Therapy Charges for Today     Code Description Service Date Service Provider Modifiers Qty    06721441468 HC PT THER PROC EA 15 MIN 3/9/2022 Judy Wan, PT GP 1    04043252415 HC PT EVAL LOW COMPLEXITY 3 3/9/2022 Judy Wan, PT GP 1          PT G-Codes  Outcome Measure Options: Other Outcome Measure         Judy Wan, PT  3/9/2022

## 2022-03-16 ENCOUNTER — HOSPITAL ENCOUNTER (OUTPATIENT)
Dept: PHYSICAL THERAPY | Facility: HOSPITAL | Age: 82
Setting detail: THERAPIES SERIES
Discharge: HOME | End: 2022-03-16

## 2022-03-16 DIAGNOSIS — M51.36 DDD (DEGENERATIVE DISC DISEASE), LUMBAR: Primary | ICD-10-CM

## 2022-03-16 DIAGNOSIS — Z98.1 HX OF SPINAL FUSION: ICD-10-CM

## 2022-03-16 DIAGNOSIS — M48.062 SPINAL STENOSIS OF LUMBAR REGION WITH NEUROGENIC CLAUDICATION: ICD-10-CM

## 2022-03-16 PROCEDURE — 97110 THERAPEUTIC EXERCISES: CPT

## 2022-03-16 NOTE — THERAPY TREATMENT NOTE
Outpatient Physical Therapy [facility] Treatment Note  [facility]     Patient Name: Frandy Perkins  : 1940  MRN: [id number]  Today's Date: 3/16/2022      Visit Date: 2022    Visit Dx:    ICD-10-CM ICD-9-CM   1. DDD (degenerative disc disease), lumbar  M51.36 722.52   2. Spinal stenosis of lumbar region with neurogenic claudication  M48.062 724.03   3. Hx of spinal fusion  Z98.1 V45.4       Patient Active Problem List   Diagnosis   • DDD (degenerative disc disease), lumbar   • Spinal stenosis of lumbar region with neurogenic claudication   • History of spinal fusion        Past Medical History:   Diagnosis Date   • Arthritis    • Hypertension         Past Surgical History:   Procedure Laterality Date   • BACK SURGERY      hx of 3 back surgeries   • ORTHOPEDIC SURGERY          PT Ortho     Row Name 22 09       Precautions and Contraindications    Precautions hx of 3 lumbar surgeries; most recent in 2017  -LN       Subjective Pain    Able to rate subjective pain? yes  -LN    Pre-Treatment Pain Level 6  6-7/10  -LN          User Key  (r) = Recorded By, (t) = Taken By, (c) = Cosigned By    Initials Name Provider Type    Judy Ventura, PT Physical Therapist                             PT Assessment/Plan     Row Name 22 0900          PT Assessment    Assessment Comments Patient tolerated exercises fairly well, with no c/o increased pain but B LE fatigue fairly quickly. He still stands in moderate FF posture and has difficulty not bending forward more with standing exercises. Still needing MET for pelvic obliquity. No recent falls reported; using SPC.  -LN            PT Plan    PT Frequency 2x/week;1x/week  -LN     PT Plan Comments Continue per POC. Progress exercises as tolerated. Modalities PRN; MET PRN. Consider beginning recumbent bicycle next visit as tolerated.  -LN           User Key  (r) = Recorded By, (t) = Taken By, (c) = Cosigned By    Initials Name Provider Type    AZAR  "Judy Wan, PT Physical Therapist                 Modalities     Row Name 03/16/22 0900             Subjective Comments    Subjective Comments \"I'm okay\" \"I'm about the same.\" \"I have been a little more sore; like in the muscles.\" \"I haven't fallen\"; using SPC for gait; antonella if he has to walk a distance. \"I haven't used the heating pad, but my wife did get it out.\"  -LN              Moist Heat    Patient denies application of MH Yes  to do at home as needed  -LN            User Key  (r) = Recorded By, (t) = Taken By, (c) = Cosigned By    Initials Name Provider Type    LN Judy Wan, PT Physical Therapist               OP Exercises     Row Name 03/16/22 0900 03/16/22 0800 03/16/22 0700       Subjective Comments    Subjective Comments \"I'm okay\" \"I'm about the same.\" \"I have been a little more sore; like in the muscles.\" \"I haven't fallen\"; using SPC for gait; antonella if he has to walk a distance. \"I haven't used the heating pad, but my wife did get it out.\"  -LN -- --       Subjective Pain    Able to rate subjective pain? yes  -LN -- --    Pre-Treatment Pain Level 6  6-7/10  -LN -- --       Total Minutes    05022 - PT Therapeutic Exercise Minutes -- 25  -LN --    81943 - PT Manual Therapy Minutes -- -- 6  -LN       Exercise 1    Exercise Name 1 -- PPT  -LN --    Cueing 1 -- Verbal;Tactile;Demo  -LN --    Reps 1 -- 10  -LN --    Time 1 -- 5 sec  -LN --       Exercise 2    Exercise Name 2 -- Hooklying ball squeeze  -LN --    Cueing 2 -- Verbal;Tactile;Demo  -LN --    Reps 2 -- 10  -LN --    Time 2 -- 5 sec  -LN --       Exercise 3    Exercise Name 3 -- Supine clam shell vs TB  -LN --    Cueing 3 -- Verbal;Tactile;Demo  -LN --    Reps 3 -- 10 B; 10L; 10 R  -LN --    Time 3 -- 5 sec  -LN --    Additional Comments -- Black TB  -LN --       Exercise 4    Exercise Name 4 -- seated hip flexion with stomach muscles tight.  -LN --    Cueing 4 -- Verbal;Tactile;Demo  -LN --    Reps 4 -- 10  -LN --    Time 4 " -- 2 sec  -LN --       Exercise 5    Exercise Name 5 -- LAQ  -LN --    Cueing 5 -- Verbal;Tactile;Demo  -LN --    Reps 5 -- 10 each  -LN --    Time 5 -- 2 sec  -LN --       Exercise 6    Exercise Name 6 -- standing hip extension  -LN --    Cueing 6 -- Verbal;Tactile;Demo  -LN --    Reps 6 -- 10 each  -LN --    Time 6 -- 2 sec  -LN --    Additional Comments -- at elevated table for support; cueing needed to try and stand more upright and not bend forward more with leg movement and to not bring leg back too far.  -LN --       Exercise 7    Exercise Name 7 -- standing hip abduction  -LN --    Cueing 7 -- Verbal;Tactile;Demo  -LN --    Reps 7 -- 10 each  -LN --    Time 7 -- 2 sec  -LN --    Additional Comments -- at elevated table for support; cueing needed to try and stand more upright and not bend forward more with leg movement.  -LN --       Exercise 8    Exercise Name 8 -- SLR  -LN --    Cueing 8 -- Verbal;Tactile;Demo  -LN --    Reps 8 -- 10  -LN --    Time 8 -- 2 sec  -LN --       Exercise 9    Exercise Name 9 -- --  -LN --    Cueing 9 -- --  -LN --    Reps 9 -- --  -LN --    Time 9 -- --  -LN --          User Key  (r) = Recorded By, (t) = Taken By, (c) = Cosigned By    Initials Name Provider Type    Judy Ventura, PT Physical Therapist                         Manual Rx (last 36 hours)     Manual Treatments     Row Name 03/16/22 0700             Total Minutes    81952 - PT Manual Therapy Minutes 6  -LN              Manual Rx 1    Manual Rx 1 Location pelvis  -LN      Manual Rx 1 Type MET: shotgun/right anterior innominate/right pelvic inflare  -LN      Manual Rx 1 Duration Improved pelvic alignment noted after MET.  -LN            User Key  (r) = Recorded By, (t) = Taken By, (c) = Cosigned By    Initials Name Provider Type    Judy Ventura, PT Physical Therapist                    Therapy Education  Education Details: Patient to continue to work on HEP 1 x day and use HP PRN. Patient to   front of mirror in bathroom and work on more upright posture as tolerated.  Given: HEP, Symptoms/condition management, Pain management, Posture/body mechanics  Program: New, Reinforced, Progressed (added R & L leg only to supine clamshell and still to do B; added SLR and standing hip abduction)  How Provided: Verbal, Demonstration, Written  Provided to: Patient  Level of Understanding: Teach back education performed, Verbalized, Demonstrated              Time Calculation:   Start Time: 0900  Stop Time: 0935  Time Calculation (min): 35 min  Timed Charges  66882 - PT Therapeutic Exercise Minutes: 25  31351 - PT Manual Therapy Minutes: 6  Total Minutes  Timed Charges Total Minutes: 25   Total Minutes: 25  Therapy Charges for Today     Code Description Service Date Service Provider Modifiers Qty    91988992303 HC PT THER PROC EA 15 MIN 3/16/2022 Judy Wan, PT GP 2                    Judy Wan, PT  3/16/2022

## 2022-03-22 ENCOUNTER — HOSPITAL ENCOUNTER (OUTPATIENT)
Dept: PHYSICAL THERAPY | Facility: HOSPITAL | Age: 82
Setting detail: THERAPIES SERIES
Discharge: HOME | End: 2022-03-22

## 2022-03-22 DIAGNOSIS — M51.36 DDD (DEGENERATIVE DISC DISEASE), LUMBAR: Primary | ICD-10-CM

## 2022-03-22 DIAGNOSIS — M48.062 SPINAL STENOSIS OF LUMBAR REGION WITH NEUROGENIC CLAUDICATION: ICD-10-CM

## 2022-03-22 DIAGNOSIS — Z98.1 HX OF SPINAL FUSION: ICD-10-CM

## 2022-03-22 PROCEDURE — 97110 THERAPEUTIC EXERCISES: CPT

## 2022-03-22 NOTE — THERAPY TREATMENT NOTE
"    Outpatient Physical Therapy [facility] Treatment Note  [facility]     Patient Name: Frandy Perkins  : 1940  MRN: [id number]  Today's Date: 3/22/2022      Visit Date: 2022    Visit Dx:    ICD-10-CM ICD-9-CM   1. DDD (degenerative disc disease), lumbar  M51.36 722.52   2. Spinal stenosis of lumbar region with neurogenic claudication  M48.062 724.03   3. Hx of spinal fusion  Z98.1 V45.4       Patient Active Problem List   Diagnosis   • DDD (degenerative disc disease), lumbar   • Spinal stenosis of lumbar region with neurogenic claudication   • History of spinal fusion        Past Medical History:   Diagnosis Date   • Arthritis    • Hypertension         Past Surgical History:   Procedure Laterality Date   • BACK SURGERY      hx of 3 back surgeries   • ORTHOPEDIC SURGERY          PT Ortho     Row Name 22 0800       Subjective Comments    Subjective Comments \"I'm about the same; I am pretty sore in the mornings.\" \"I have been doing my exercises but I forgot on .\"  -LN       Precautions and Contraindications    Precautions hx of 3 lumbar surgeries; most recent in 2017  -LN       Subjective Pain    Able to rate subjective pain? yes  -LN    Pre-Treatment Pain Level 6  -LN          User Key  (r) = Recorded By, (t) = Taken By, (c) = Cosigned By    Initials Name Provider Type    Judy Ventura, PT Physical Therapist                             PT Assessment/Plan     Row Name 22 0900          PT Assessment    Assessment Comments Patient tolerated exercises well and he is riding his recumbent bicycle daily at home. Pain level is staying about the same. Still needing MET for pelvic alignment. He does seem to be standing and walking with a little less FF now.  -LN            PT Plan    PT Frequency 2x/week;1x/week  -LN     PT Plan Comments Continue per POC. Progress exercises as tolerated. Modalities PRN; MET PRN.  -LN           User Key  (r) = Recorded By, (t) = Taken By, (c) = " "Cosigned By    Initials Name Provider Type    Judy Ventura, PT Physical Therapist                 Modalities     Row Name 03/22/22 0900             Moist Heat    Patient denies application of MH Yes  to use at home PRN  -LN            User Key  (r) = Recorded By, (t) = Taken By, (c) = Cosigned By    Initials Name Provider Type    Judy Ventura, PT Physical Therapist               OP Exercises     Row Name 03/22/22 0900 03/22/22 0800 03/22/22 0700       Subjective Comments    Subjective Comments -- \"I'm about the same; I am pretty sore in the mornings.\" \"I have been doing my exercises but I forgot on Sunday.\"  -LN --       Subjective Pain    Able to rate subjective pain? -- yes  -LN --    Pre-Treatment Pain Level -- 6  -LN --       Total Minutes    03184 - PT Therapeutic Exercise Minutes 25  -LN -- --    18349 - PT Manual Therapy Minutes -- -- 6  -LN       Exercise 1    Exercise Name 1 PPT  -LN -- --    Cueing 1 Verbal;Tactile;Demo  -LN -- --    Reps 1 10  -LN -- --    Time 1 5 sec  -LN -- --       Exercise 2    Exercise Name 2 Hooklying ball squeeze  -LN -- --    Cueing 2 Verbal;Tactile;Demo  -LN -- --    Reps 2 10  -LN -- --    Time 2 5 sec  -LN -- --       Exercise 3    Exercise Name 3 Supine clam shell vs TB  -LN -- --    Cueing 3 Verbal;Tactile;Demo  -LN -- --    Reps 3 10 B; 10L; 10 R  -LN -- --    Time 3 5 sec  -LN -- --    Additional Comments Black  -LN -- --       Exercise 4    Exercise Name 4 seated hip flexion with stomach muscles tight. seated on tony disc  -LN -- --    Cueing 4 Verbal;Tactile;Demo  -LN -- --    Reps 4 10  -LN -- --    Time 4 2 sec  -LN -- --       Exercise 5    Exercise Name 5 LAQ seated  on tony disc  -LN -- --    Cueing 5 Verbal;Tactile;Demo  -LN -- --    Reps 5 10 each  -LN -- --    Time 5 2 sec  -LN -- --       Exercise 6    Exercise Name 6 standing hip extension  -LN -- --    Cueing 6 Verbal;Tactile;Demo  -LN -- --    Reps 6 10 each  -LN -- --    Time 6 2 " sec  -LN -- --       Exercise 7    Exercise Name 7 standing hip abduction  -LN -- --    Cueing 7 Verbal;Tactile;Demo  -LN -- --    Reps 7 10 each  -LN -- --    Time 7 2 sec  -LN -- --       Exercise 8    Exercise Name 8 SLR  -LN -- --    Cueing 8 Verbal;Tactile;Demo  -LN -- --    Reps 8 10  -LN -- --    Time 8 2 sec  -LN -- --       Exercise 9    Exercise Name 9 SAQ  -LN -- --    Cueing 9 Verbal;Tactile;Demo  -LN -- --    Reps 9 10 each  -LN -- --    Time 9 5 sec  -LN -- --       Exercise 10    Exercise Name 10 standing calf raises  -LN -- --    Cueing 10 Verbal;Tactile;Demo  -LN -- --    Reps 10 10  -LN -- --          User Key  (r) = Recorded By, (t) = Taken By, (c) = Cosigned By    Initials Name Provider Type    Judy Ventura, PT Physical Therapist                         Manual Rx (last 36 hours)     Manual Treatments     Row Name 03/22/22 0700             Total Minutes    07736 - PT Manual Therapy Minutes 6  -LN              Manual Rx 1    Manual Rx 1 Location pelvis  -LN      Manual Rx 1 Type MET: shotgun/right anterior innominate/right pelvic inflare  -LN      Manual Rx 1 Duration Improved pelvic alignment noted after MET.  -LN            User Key  (r) = Recorded By, (t) = Taken By, (c) = Cosigned By    Initials Name Provider Type    Judy Ventura, PT Physical Therapist                    Therapy Education  Education Details: Patient to continue with HEP 1 x day and use HP PRN at home. He is to continue to ride his recumbent bicycle daily at home with light resistance and to work on more upright posture in front of mirror in bathroom.  Given: HEP, Symptoms/condition management, Pain management, Posture/body mechanics  Program: New, Reinforced (added SAQ and standing calf raises)  How Provided: Verbal, Demonstration, Written  Provided to: Patient  Level of Understanding: Teach back education performed, Verbalized, Demonstrated              Time Calculation:   Start Time: 0900  Stop  Time: 0935  Time Calculation (min): 35 min  Timed Charges  96383 - PT Therapeutic Exercise Minutes: 25  40360 - PT Manual Therapy Minutes: 6  Total Minutes  Timed Charges Total Minutes: 25   Total Minutes: 25  Therapy Charges for Today     Code Description Service Date Service Provider Modifiers Qty    93724331052  PT THER PROC EA 15 MIN 3/22/2022 Judy Wan, PT GP 2                    Judy Wan, PT  3/22/2022

## 2022-03-24 ENCOUNTER — HOSPITAL ENCOUNTER (OUTPATIENT)
Dept: PHYSICAL THERAPY | Facility: HOSPITAL | Age: 82
Setting detail: THERAPIES SERIES
Discharge: HOME | End: 2022-03-24

## 2022-03-24 DIAGNOSIS — Z98.1 HX OF SPINAL FUSION: ICD-10-CM

## 2022-03-24 DIAGNOSIS — M51.36 DDD (DEGENERATIVE DISC DISEASE), LUMBAR: Primary | ICD-10-CM

## 2022-03-24 DIAGNOSIS — M48.062 SPINAL STENOSIS OF LUMBAR REGION WITH NEUROGENIC CLAUDICATION: ICD-10-CM

## 2022-03-24 PROCEDURE — 97110 THERAPEUTIC EXERCISES: CPT

## 2022-03-24 NOTE — THERAPY TREATMENT NOTE
"    Outpatient Physical Therapy Ortho Treatment Note   [place]     Patient Name: Frandy Perkins  : 1940  MRN: [id number]  Today's Date: 3/24/2022      Visit Date: 2022    Visit Dx:    ICD-10-CM ICD-9-CM   1. DDD (degenerative disc disease), lumbar  M51.36 722.52   2. Spinal stenosis of lumbar region with neurogenic claudication  M48.062 724.03   3. Hx of spinal fusion  Z98.1 V45.4       Patient Active Problem List   Diagnosis   • DDD (degenerative disc disease), lumbar   • Spinal stenosis of lumbar region with neurogenic claudication   • History of spinal fusion        Past Medical History:   Diagnosis Date   • Arthritis    • Hypertension         Past Surgical History:   Procedure Laterality Date   • BACK SURGERY      hx of 3 back surgeries   • ORTHOPEDIC SURGERY          PT Ortho     Row Name 22 09       Subjective Comments    Subjective Comments \"I cut grass yesterday on the riding mower and I did okay with it and my back felt about the same as it always does.\" \"I feel about the same.\" \"I am terribly stiff and sore when I wake up in the mornings.\" \"I did use our HP yesterday and it did help some.\"  -LN       Precautions and Contraindications    Precautions hx of 3 lumbar surgeries; most recent in 2017  -LN       Subjective Pain    Able to rate subjective pain? yes  -LN    Pre-Treatment Pain Level 6  -LN          User Key  (r) = Recorded By, (t) = Taken By, (c) = Cosigned By    Initials Name Provider Type    Judy Ventura, PT Physical Therapist                             PT Assessment/Plan     Row Name 22          PT Assessment    Assessment Comments Patient with no significant change in LBP, but improved pelvic alignment noted today. He tolerates exercises fairly well and able to add 1# to leg exercises. He still tends to stand and walk with FF posture; feel some of this is habit.  -LN            PT Plan    PT Frequency 2x/week;1x/week  -LN     PT Plan Comments " "Continue per POC. Progress exercises as tolerated. Modalities PRN; MET PRN. Add forward step ups as tolerated.  -LN           User Key  (r) = Recorded By, (t) = Taken By, (c) = Cosigned By    Initials Name Provider Type    Judy Ventura, PT Physical Therapist                 Modalities     Row Name 03/24/22 0900             Moist Heat    Patient denies application of MH Yes  to do at home PRN  -LN            User Key  (r) = Recorded By, (t) = Taken By, (c) = Cosigned By    Initials Name Provider Type    Judy Ventura, PT Physical Therapist               OP Exercises     Row Name 03/24/22 0900             Subjective Comments    Subjective Comments \"I cut grass yesterday on the riding mower and I did okay with it and my back felt about the same as it always does.\" \"I feel about the same.\" \"I am terribly stiff and sore when I wake up in the mornings.\" \"I did use our HP yesterday and it did help some.\"  -LN              Subjective Pain    Able to rate subjective pain? yes  -LN      Pre-Treatment Pain Level 6  -LN              Total Minutes    73767 - PT Therapeutic Exercise Minutes 28  -LN      01068 - PT Manual Therapy Minutes 5  -LN              Exercise 1    Exercise Name 1 PPT  -LN      Cueing 1 Verbal;Tactile;Demo  -LN      Reps 1 10  -LN      Time 1 5 sec  -LN              Exercise 2    Exercise Name 2 Hooklying ball squeeze  -LN      Cueing 2 Verbal;Tactile;Demo  -LN      Reps 2 10  -LN      Time 2 5 sec  -LN              Exercise 3    Exercise Name 3 Supine clam shell vs TB  -LN      Cueing 3 Verbal;Tactile;Demo  -LN      Reps 3 10 B; 10L; 10 R  -LN      Time 3 5 sec  -LN      Additional Comments Black  -LN              Exercise 4    Exercise Name 4 seated hip flexion with stomach muscles tight. seated on tony disc  -LN      Cueing 4 Verbal;Tactile;Demo  -LN      Reps 4 10  -LN      Time 4 2 sec  -LN      Additional Comments 1# on each ankle  -LN              Exercise 5    Exercise Name " 5 LAQ seated  on tony disc  -LN      Cueing 5 Verbal;Tactile;Demo  -LN      Reps 5 10 each  -LN      Time 5 2 sec  -LN      Additional Comments 1# on each ankle  -LN              Exercise 6    Exercise Name 6 standing hip extension  -LN      Cueing 6 Verbal;Tactile;Demo  -LN      Reps 6 10 each  -LN      Time 6 2 sec  -LN      Additional Comments 1# on each ankle  -LN              Exercise 7    Exercise Name 7 standing hip abduction  -LN      Cueing 7 Verbal;Tactile;Demo  -LN      Reps 7 10 each  -LN      Time 7 2 sec  -LN      Additional Comments 1# on each ankle  -LN              Exercise 8    Exercise Name 8 SLR  -LN      Cueing 8 Verbal;Tactile;Demo  -LN      Reps 8 10  -LN      Time 8 2 sec  -LN      Additional Comments 1# on each ankle  -LN              Exercise 9    Exercise Name 9 SAQ  -LN      Cueing 9 Verbal;Tactile;Demo  -LN      Reps 9 10 each  -LN      Time 9 5 sec  -LN      Additional Comments 1# on each ankle  -LN              Exercise 10    Exercise Name 10 standing calf raises  -LN      Cueing 10 Verbal;Tactile;Demo  -LN      Reps 10 10  -LN      Additional Comments 1# on each ankle  -LN              Exercise 11    Exercise Name 11 sit to stand  hands on thighs and having him work on standing more upright once he stands up.  -LN      Cueing 11 Verbal;Tactile;Demo  -LN      Reps 11 10  -LN      Additional Comments 1# on each ankle  -LN            User Key  (r) = Recorded By, (t) = Taken By, (c) = Cosigned By    Initials Name Provider Type    Judy Ventura, PT Physical Therapist                         Manual Rx (last 36 hours)     Manual Treatments     Row Name 03/24/22 0900             Total Minutes    61073 - PT Manual Therapy Minutes 5  -LN              Manual Rx 1    Manual Rx 1 Location pelvis  -LN      Manual Rx 1 Type MET: shotgun/Right pelvic inflare  -LN      Manual Rx 1 Duration Improved pelvic alignment noted after MET; no anterior rotation noted today.  -LN            User  Key  (r) = Recorded By, (t) = Taken By, (c) = Cosigned By    Initials Name Provider Type    Judy Ventura, PT Physical Therapist                    Therapy Education  Education Details: Pt to continue with HEP 1-2 x day as tolerated and use HP PRN at home.  Given: HEP, Symptoms/condition management, Pain management, Posture/body mechanics  Program: New, Reinforced, Progressed (added sit to stand exercise with hands on thighs)  How Provided: Verbal, Demonstration, Written  Provided to: Patient  Level of Understanding: Teach back education performed, Verbalized, Demonstrated              Time Calculation:   Start Time: 0900  Stop Time: 0935  Time Calculation (min): 35 min  Timed Charges  86582 - PT Therapeutic Exercise Minutes: 28  75981 - PT Manual Therapy Minutes: 5  Total Minutes  Timed Charges Total Minutes: 33   Total Minutes: 33  Therapy Charges for Today     Code Description Service Date Service Provider Modifiers Qty    83381100853 HC PT THER PROC EA 15 MIN 3/24/2022 Judy Wan, PT GP 2                    Judy Wan, PT  3/24/2022

## 2022-03-28 ENCOUNTER — OFFICE VISIT (OUTPATIENT)
Dept: PAIN MEDICINE | Facility: CLINIC | Age: 82
End: 2022-03-28

## 2022-03-28 VITALS
WEIGHT: 210.8 LBS | OXYGEN SATURATION: 97 % | HEIGHT: 70 IN | BODY MASS INDEX: 30.18 KG/M2 | DIASTOLIC BLOOD PRESSURE: 76 MMHG | RESPIRATION RATE: 18 BRPM | TEMPERATURE: 96.9 F | SYSTOLIC BLOOD PRESSURE: 131 MMHG | HEART RATE: 66 BPM

## 2022-03-28 DIAGNOSIS — M48.062 SPINAL STENOSIS OF LUMBAR REGION WITH NEUROGENIC CLAUDICATION: ICD-10-CM

## 2022-03-28 DIAGNOSIS — Z98.1 HISTORY OF SPINAL FUSION: Primary | ICD-10-CM

## 2022-03-28 DIAGNOSIS — M47.816 LUMBAR FACET ARTHROPATHY: ICD-10-CM

## 2022-03-28 DIAGNOSIS — M51.36 DDD (DEGENERATIVE DISC DISEASE), LUMBAR: ICD-10-CM

## 2022-03-28 PROCEDURE — 99204 OFFICE O/P NEW MOD 45 MIN: CPT | Performed by: NURSE PRACTITIONER

## 2022-03-28 RX ORDER — ASPIRIN 81 MG
200 TABLET, DELAYED RELEASE (ENTERIC COATED) ORAL NIGHTLY PRN
COMMUNITY

## 2022-03-28 NOTE — PATIENT INSTRUCTIONS
"-------  Education about Medial Branch Blockade and RF Therapy:    This medial branch blockade (MBB) suggested is intended for diagnostic purposes, with the intent of offering the patient Radiofrequency thermal rhizotomy (RF) if the MBB is diagnostically effective.  The diagnostic blockade is necessary to determine the likelihood that RF therapy could be efficacious in providing long term relief to the patient.    Medial branches are sensory nerve branches that connect to a facet joint and transmit sensations & pain signals from that joint.  Facet is a term for the type of joints found in the spine.  Medial branches are the nerves that go to a facet, and therefore are also sometimes called \"facet joint nerves\" (FJNs).      In a medial branch blockade procedure, xray fluoroscopy is used to verify the locations of the outside of the joint lines which are being targeted.  Under xray guidance, needles are placed to these areas.  Contrast dye is injected to confirm proper placement, with dye flowing over the joint area, and to ensure that the dye does not flow into unintended areas such as a vein.  When this is confirmed, local anesthetic is injected to block the medial branch at that joint level.      If MBBs are diagnostically successful in blocking pain, then the patient is most likely a great candidate for Radiofrequency of those facet joint nerves.  In the RF procedure, needles are placed to the joint lines in the same fashion, and after testing, the needle tips are heated to thermally treat the nerves, blocking the nerves by in essence damaging the nerves with the heat treatment.       Medically, a successful RF procedure should provide a patient with 50% pain relief or more for at least 6 months.  Clinical experience suggests that successful patients receive relief more in the range of 12 months on average.  We also discussed that a fortunate minority of patients receive therapeutic success from the MBB, and may " not require RF ablation.  If a patient receives more than 8 weeks of relief from MBB, then occasional repeat MBB for therapeutic purposes is a very reasonable alternative therapy.  This course of therapy is consistent with our LCDs according to our CMS  in the area, and therefore other insurance providers should follow accordingly.  We will monitor our patients to screen for these therapeutic responders and will offer RF therapy only when necessary.        We discussed that MBB & RF are not without risks.  Guidelines regarding anticoagulant use & neuraxial procedures will be respected.  Patients that are ill or otherwise may be at risk for sepsis will not have their spines accessed by neuraxial injections of any type.  This patient will not be offered these therapies if there is an increased risk.   We discussed that there is a risk of postprocedural pain and also a risk of worsening of clinical picture with these procedures as with any neuraxial procedure.    -------      --------  Education about Radiofrequency Lesioning of Medial Branches:    The medial branch blockade was intended for diagnostic purposes, with the intent of offering the patient Radiofrequency thermal rhizotomy if the MBB is diagnostically effective.  The diagnostic blockade is necessary to determine the likelihood that RF therapy could be efficacious in providing long term relief to the patient.  As indicated above, diagnostic efficacy was established.      In the RF procedure, needles are placed to the joint lines in the same fashion, and after testing, the needle tips are heated to thermally treat the nerves, blocking the nerves by in essence damaging the nerves with the heat treatment.      Medically, a successful RF procedure should provide a patient with 50% pain relief or more for at least 6 months.  We estimate a likelihood of about an 80% chance that medical success will be realized.  We discussed & educated once again that  not all patients have a medically successful result, and the patient voices understanding.  However, our clinical experience suggests that successful patients receive relief more in the range of 12 months on average.  (We also discussed that a fortunate minority of patients receive therapeutic success from the MBB, and may not require RF ablation.  If a patient receives more than 8 weeks of relief from MBB, then occasional repeat MBB for therapeutic purposes is a very reasonable alternative therapy.  This course of therapy is consistent with our LCDs according to our CMS  in the area, and therefore other insurance providers should follow accordingly.  We will monitor our patients to screen for these therapeutic responders and will offer RF therapy only when necessary.  However, in this clinical scenario, this therapeutic result was not realized, and therefore Radiofrequency Lesioning is medically necessary.)      We discussed that MBB & RF are not without risks.  Guidelines regarding anticoagulant use & neuraxial procedures will be respected.  Patients that are ill or otherwise may be at risk for sepsis will not have their spines accessed by neuraxial injections of any type.  This patient will not be offered these therapies if there is an increased risk.   We discussed that there is a risk of postprocedural pain and also a risk of worsening of clinical picture with these procedures as with any neuraxial procedure.  All invasive procedures have risks including but not limited to bleeding, infection, injury, nerve injury, paralysis, coma, death, lack of pain relief, and worsening of clinical picture.      In this education session, all of these topics were covered and the patient voiced understanding.    ---------

## 2022-03-28 NOTE — PROGRESS NOTES
"CHIEF COMPLAINT  Mr. Perkins states that he has low back pain that started about 10 years ago. He has a history of 3 back surgeries done by Dr. Kenneth Damon. He is currently in PT for his back pain.    Subjective   Frandy Perkins is a 81 y.o. male.   He presents to the office for evaluation of back pain. He presents with his wife who is helpful in providing history. He was referred here by Dr. Nair.     Today his pain is 6/10VAS in severity. His back pain started ~10-15 years ago with no inciting event.  He describes his back pain as continuous aching pain that radiates into his bilateral hips and into the tops of his lateral thighs.  His pain is worsened by prolonged standing, walking, bending, twisting, lifting.  His pain is improved by sitting and lying down, heat, OTC tylenol, meloxicam 15 mg daily, and Gabapentin 600 mg BID (Managed by Dr. Forrest).     He does report weakness in his legs and states \"that is why I'm in physical therapy\".  He does have N/T in his bilateral toes, he denies any bowel or bladder incontinence.     He is retired from a chemical plant.  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  He drinks a couple beers \"every now and then\", He denies any history of illicit drug use.  He smoked 1 PPD x 20 years.  He quit 30 years ago. He denies any family history of drug or alcohol abuse.     Past pain medications: Norco (only after surgeries)     Current pain medications: Gabapentin, Meloxicam, and Tylenol     Past therapies:  Physical Therapy: Yes, somewhat helpful previously. He is currently in PT now as well.   Chiropractor: Yes  Massage Therapy: No  TENS: Yes at therapy, temporarily helpful  Neck or back surgery: Lumbar surgery x 3 (2012, 2016, 2017 with Dr. Damon)  Past pain management: [facility] pain clinic     Previous Injections: Epidurals  Effect of Injection " (%): minimal relief, if any    Back Pain  This is a chronic problem. The current episode started more than 1 year ago. The problem occurs constantly. The problem has been gradually worsening since onset. The pain is present in the lumbar spine. The quality of the pain is described as aching. Radiates to: bilateral hips. The pain is at a severity of 6/10. The pain is worse during the day. The symptoms are aggravated by bending, standing and twisting (walking). Stiffness is present in the morning. Associated symptoms include numbness (bilateral hands and toes) and weakness. Pertinent negatives include no chest pain. He has tried NSAIDs, heat and chiropractic manipulation (Tylenol) for the symptoms.      PEG Assessment   What number best describes your pain on average in the past week?6  What number best describes how, during the past week, pain has interfered with your enjoyment of life?3  What number best describes how, during the past week, pain has interfered with your general activity?  3      Current Outpatient Medications:   •  acetaminophen (TYLENOL) 325 MG tablet, Take 650 mg by mouth., Disp: , Rfl:   •  Docusate Sodium 100 MG capsule, Take 200 mg by mouth At Night As Needed for Constipation., Disp: , Rfl:   •  gabapentin (NEURONTIN) 600 MG tablet, Take 600 mg by mouth 3 (Three) Times a Day., Disp: , Rfl:   •  hydroCHLOROthiazide (HYDRODIURIL) 25 MG tablet, Take 25 mg by mouth Daily., Disp: , Rfl:   •  lisinopril (PRINIVIL,ZESTRIL) 20 MG tablet, Take 20 mg by mouth Daily., Disp: , Rfl:   •  meloxicam (MOBIC) 15 MG tablet, Take 7.5 mg by mouth 2 (Two) Times a Day., Disp: , Rfl:   •  simvastatin (ZOCOR) 10 MG tablet, Take 10 mg by mouth Every Night., Disp: , Rfl:   •  verapamil SR (CALAN-SR) 240 MG CR tablet, Take 240 mg by mouth Every Night., Disp: , Rfl:   •  vitamin B-12 (CYANOCOBALAMIN) 500 MCG tablet, Take 5,000 mcg by mouth Daily., Disp: , Rfl:   •  sildenafil (VIAGRA) 100 MG tablet, Take 100 mg by mouth.,  Disp: , Rfl:     The following portions of the patient's history were reviewed and updated as appropriate: allergies, current medications, past family history, past medical history, past social history, past surgical history and problem list.      REVIEW OF PERTINENT MEDICAL DATA    Office visit from 3/2/2020 with Dr. Nair reviewed patient was seen in follow-up after myelogram done on 1/24/2022 for lumbar stenosis.  He reports back pain that radiates into his bilateral hips with weakness.  He denies numbness, tingling, loss of bowel or bladder control.  Myelogram showed adjacent level stenosis at L1-2 which is quite severe.  He has severe back pain and worsening of his pain when he stands up but he does not describe any significant leg pain.  Patient does report weakness.  Would like to avoid surgery at L1-2 if possible.  Back pain could be helped by radiofrequency ablation.  We will also send to therapy to work on his leg strength, will follow up in 4 months.    POSTCONTRAST CT SCAN OF THE LUMBAR SPINE INCLUDING RECONSTRUCTION IMAGES  01/24/2022     HISTORY: Low back pain. Bilateral leg pain. Previous lumbar fusion.     Following the lumbar spine myelogram, postmyelogram CT scan was obtained  from the lower thoracic spine to the sacrum. Sagittal and coronal  reconstruction images were reviewed.     At T10-11 there is a small left paracentral disc osteophyte complex. No  mass effect on the spinal cord is seen. There is mild ligamentum flavum  hypertrophy and mild canal stenosis at T10-11.     The T11-T12 intervertebral disc appears within normal limits with no  canal stenosis.     At T12-L1 there is a minimal disc bulge most prominent in the left  paracentral position. There is facet joint arthropathy, ligamentum  flavum hypertrophy and mild canal stenosis at T12-L1. No significant  cord flattening is seen.     At L1-2 there is severe vacuum disc phenomenon with degenerative  endplate changes and disc space  narrowing. Mild-to-moderate disc  osteophyte complex is seen. There is bilateral foraminal narrowing.  There is bilateral facet joint arthropathy and moderately severe canal  stenosis at L1-2, best seen on axial image 54 through 58.     Bilateral pedicle screws are seen fusing L2 and L3.     At L2-3 there is vacuum disc phenomenon with moderate disc osteophyte  complex, most pronounced in the left paracentral position as seen on  axial image 66. Laminectomy defect appears to be present. There is  bilateral foraminal narrowing and mild-to-moderate canal stenosis at  L2-3.     At L3-4 there is essentially complete loss of the intervertebral disc  space with minimal anterolisthesis of L3 on L4 and mild to moderate disc  osteophyte complex. There is right foraminal narrowing. There is facet  joint arthropathy and mild canal stenosis at L3-4, best seen on axial  image 76.     At L4-5 there is a vacuum disc phenomenon with approximately 6 mm  retrolisthesis of L4 on L5 and some uncovering of the intervertebral  disc as well as a broad-based disc bulge. There is bilateral foraminal  narrowing, facet joint arthropathy and minimal canal stenosis at L4-5.     Vacuum disc phenomenon is seen at L5-S1 with mild disc osteophyte  complex. There is bilateral facet joint arthropathy and ligamentum  flavum hypertrophy with mild canal stenosis at L5-S1.     The lumbar spine myelogram images show the L2 and L3 pedicle screws in  good position. Mild canal stenosis is seen at L1-2 and moderate canal  stenosis is seen at L2-3. There also was some delayed passage of  contrast material into the lower thecal sac related to canal stenosis at  L3-4. The lower lumbar thecal sac is not optimally opacified.     IMPRESSION:  Postoperative and multilevel lumbar degenerative disease  including an element of spinal canal stenosis at multiple levels. Please  see full discussion above.     Radiation dose reduction techniques were utilized, including  "automated  exposure control and exposure modulation based on body size.     This report was finalized on 1/25/2022 5:10 PM by Dr. Kvng Hines M.D.     Review of Systems   Constitutional: Positive for fatigue.   HENT: Negative for congestion.    Eyes: Negative for visual disturbance.   Respiratory: Positive for shortness of breath. Negative for cough and wheezing.    Cardiovascular: Negative for chest pain.   Gastrointestinal: Negative for constipation and diarrhea.   Genitourinary: Negative for difficulty urinating.   Musculoskeletal: Positive for back pain.   Neurological: Positive for weakness and numbness (bilateral hands and toes).   Psychiatric/Behavioral: Negative for sleep disturbance and suicidal ideas. The patient is nervous/anxious.      Vitals:    03/28/22 1417   BP: 131/76   Pulse: 66   Resp: 18   Temp: 96.9 °F (36.1 °C)   SpO2: 97%   Weight: 95.6 kg (210 lb 12.8 oz)   Height: 177.8 cm (70\")   PainSc:   6   PainLoc: Back     Objective   Physical Exam  Vitals and nursing note reviewed.   Constitutional:       Appearance: Normal appearance. He is well-developed.   Eyes:      General: Lids are normal.   Cardiovascular:      Rate and Rhythm: Normal rate.   Pulmonary:      Effort: Pulmonary effort is normal.   Musculoskeletal:      Cervical back: Normal range of motion.      Lumbar back: Tenderness present. Decreased range of motion.      Comments: +Lumbar facet loading  +Grocery Cart Sign   Neurological:      Mental Status: He is alert and oriented to person, place, and time.      Gait: Gait abnormal (cane).   Psychiatric:         Attention and Perception: Attention normal.         Mood and Affect: Mood normal.         Speech: Speech normal.         Behavior: Behavior normal.         Judgment: Judgment normal.       Assessment/Plan   Diagnoses and all orders for this visit:    1. History of spinal fusion (Primary)    2. Spinal stenosis of lumbar region with neurogenic claudication    3. DDD " "(degenerative disc disease), lumbar    4. Lumbar facet arthropathy      --- Recommend Bilateral Lumbar MBB at approximately L3-L5 (verify most painful levels under fluoro). Patient states he will think about this and then call us if he decides to move forward.     Reviewed the procedure at length with the patient.  Included in the review was expectations, complications, risk and benefits.The procedure was described in detail and the risks, benefits and alternatives were discussed with the patient (including but not limited to: bleeding, infection, nerve damage, worsening of pain, inability to perform injection, paralysis, seizures, coma, no pain relief and death) who agreed to proceed.  Discussed the potential for sedation if warranted/wanted.  The procedure will plan to be performed at [facility] with fluoroscopic guidance(unless ultrasound is indicated) and could potentially have steroids and contrast dye used. Questions were answered and in a way the patient could understand.  Patient verbalized understanding and wishes to proceed.  This intervention will be ordered. Patient has no evidence of coagulopathy or current infection.    -------  Education about Medial Branch Blockade and RF Therapy:    This medial branch blockade (MBB) suggested is intended for diagnostic purposes, with the intent of offering the patient Radiofrequency thermal rhizotomy (RF) if the MBB is diagnostically effective.  The diagnostic blockade is necessary to determine the likelihood that RF therapy could be efficacious in providing long term relief to the patient.    Medial branches are sensory nerve branches that connect to a facet joint and transmit sensations & pain signals from that joint.  Facet is a term for the type of joints found in the spine.  Medial branches are the nerves that go to a facet, and therefore are also sometimes called \"facet joint nerves\" (FJNs).      In a medial branch blockade procedure, " xray fluoroscopy is used to verify the locations of the outside of the joint lines which are being targeted.  Under xray guidance, needles are placed to these areas.  Contrast dye is injected to confirm proper placement, with dye flowing over the joint area, and to ensure that the dye does not flow into unintended areas such as a vein.  When this is confirmed, local anesthetic is injected to block the medial branch at that joint level.      If MBBs are diagnostically successful in blocking pain, then the patient is most likely a great candidate for Radiofrequency of those facet joint nerves.  In the RF procedure, needles are placed to the joint lines in the same fashion, and after testing, the needle tips are heated to thermally treat the nerves, blocking the nerves by in essence damaging the nerves with the heat treatment.       Medically, a successful RF procedure should provide a patient with 50% pain relief or more for at least 6 months.  Clinical experience suggests that successful patients receive relief more in the range of 12 months on average.  We also discussed that a fortunate minority of patients receive therapeutic success from the MBB, and may not require RF ablation.  If a patient receives more than 8 weeks of relief from MBB, then occasional repeat MBB for therapeutic purposes is a very reasonable alternative therapy.  This course of therapy is consistent with our LCDs according to our CMS  in the area, and therefore other insurance providers should follow accordingly.  We will monitor our patients to screen for these therapeutic responders and will offer RF therapy only when necessary.        We discussed that MBB & RF are not without risks.  Guidelines regarding anticoagulant use & neuraxial procedures will be respected.  Patients that are ill or otherwise may be at risk for sepsis will not have their spines accessed by neuraxial injections of any type.  This patient will not be  offered these therapies if there is an increased risk.   We discussed that there is a risk of postprocedural pain and also a risk of worsening of clinical picture with these procedures as with any neuraxial procedure.    -------  --- Follow-up after procedure     MARY REPORT    As the clinician, I personally reviewed the MARY from 3/28/2022 while the patient was in the office today.    Dictated utilizing Dragon dictation.     This document is intended for medical expert use only. Reading of this document by patients and/or patient's family without participating medical staff guidance may result in misinterpretation and unintended morbidity.   Any interpretation of such data is the responsibility of the patient and/or family member responsible for the patient in concert with their primary or specialist providers, not to be left for sources of online searches such as [place], [employer] or similar queries. Relying on these approaches to knowledge may result in misinterpretation, misguided goals of care and even death should patients or family members try recommendations outside of the realm of professional medical care in a supervised way.

## 2022-03-29 ENCOUNTER — HOSPITAL ENCOUNTER (OUTPATIENT)
Dept: PHYSICAL THERAPY | Facility: HOSPITAL | Age: 82
Setting detail: THERAPIES SERIES
Discharge: HOME | End: 2022-03-29

## 2022-03-29 DIAGNOSIS — M51.36 DDD (DEGENERATIVE DISC DISEASE), LUMBAR: Primary | ICD-10-CM

## 2022-03-29 DIAGNOSIS — M48.062 SPINAL STENOSIS OF LUMBAR REGION WITH NEUROGENIC CLAUDICATION: ICD-10-CM

## 2022-03-29 DIAGNOSIS — Z98.1 HX OF SPINAL FUSION: ICD-10-CM

## 2022-03-29 PROCEDURE — 97110 THERAPEUTIC EXERCISES: CPT

## 2022-03-29 NOTE — THERAPY TREATMENT NOTE
Outpatient Physical Therapy Ortho Treatment Note  AJ Sánchez     Patient Name: Frandy Perkins  : 1940  MRN: [id number]  Today's Date: 3/29/2022      Visit Date: 2022    Visit Dx:    ICD-10-CM ICD-9-CM   1. DDD (degenerative disc disease), lumbar  M51.36 722.52   2. Spinal stenosis of lumbar region with neurogenic claudication  M48.062 724.03   3. Hx of spinal fusion  Z98.1 V45.4       Patient Active Problem List   Diagnosis   • DDD (degenerative disc disease), lumbar   • Spinal stenosis of lumbar region with neurogenic claudication   • History of spinal fusion   • Lumbar facet arthropathy        Past Medical History:   Diagnosis Date   • Arthritis    • Hypertension         Past Surgical History:   Procedure Laterality Date   • BACK SURGERY      hx of 3 back surgeries   • ORTHOPEDIC SURGERY          PT Ortho     Row Name 22 0900       Subjective Comments    Subjective Comments pt states he feels like he may be standing taller at times; reporting compliance of his HEP  -       Precautions and Contraindications    Precautions hx of 3 lumbar surgeries; most recent in 2017  -    Row Name 22 0800       Precautions and Contraindications    Precautions --  -          User Key  (r) = Recorded By, (t) = Taken By, (c) = Cosigned By    Initials Name Provider Type    Rodolfo Samano PTA Physical Therapist Assistant                             PT Assessment/Plan     Row Name 22 0947          PT Assessment    Assessment Comments pt tolerated ex's well including increased reps as noted and addition of forward step ups; pt continues to maintain FF posture but is more aware and at time self corrects prior to being cued  -            PT Plan    PT Plan Comments Cont per POC, progressing as tolerated  -           User Key  (r) = Recorded By, (t) = Taken By, (c) = Cosigned By    Initials Name Provider Type    Rodolfo Samano, CHRISTIANO Physical Therapist Assistant                   OP  Exercises     Row Name 03/29/22 0946 03/29/22 0900          Subjective Comments    Subjective Comments -- pt states he feels like he may be standing taller at times; reporting compliance of his HEP  -            Total Minutes    07669 - PT Therapeutic Exercise Minutes 30  -MH --            Exercise 1    Exercise Name 1 -- PPT  -MH     Cueing 1 -- Verbal;Tactile;Demo  -MH     Reps 1 -- 10  -MH     Time 1 -- 5 sec  -MH            Exercise 2    Exercise Name 2 -- Hooklying ball squeeze  -MH     Cueing 2 -- Verbal;Tactile;Demo  -MH     Reps 2 -- 10  -MH     Time 2 -- 5 sec  -MH            Exercise 3    Exercise Name 3 -- Supine clam shell vs TB  -MH     Cueing 3 -- Verbal;Tactile;Demo  -     Reps 3 -- 10 B; 10L; 10 R  -     Time 3 -- 5 sec  -MH     Additional Comments -- black  -            Exercise 4    Exercise Name 4 -- seated hip flexion with stomach muscles tight. seated on tony disc  -     Cueing 4 -- Verbal;Tactile;Demo  -     Sets 4 -- 1#  -MH     Reps 4 -- 10  -MH     Time 4 -- 2 sec  -MH            Exercise 5    Exercise Name 5 -- LAQ seated  on tony disc  -     Cueing 5 -- Verbal;Tactile;Demo  -     Sets 5 -- 1#  -MH     Reps 5 -- 10 each  -MH     Time 5 -- 2 sec  -MH            Exercise 6    Exercise Name 6 -- standing hip extension  -MH     Cueing 6 -- Verbal;Tactile;Demo  -     Sets 6 -- 1#  -MH     Reps 6 -- 10 each  -MH     Time 6 -- 2 sec  -MH            Exercise 7    Exercise Name 7 -- standing hip abduction  -MH     Cueing 7 -- Verbal;Tactile;Demo  -     Sets 7 -- 1#  -MH     Reps 7 -- 10 each  -MH     Time 7 -- 2 sec  -MH            Exercise 8    Exercise Name 8 -- SLR  -MH     Cueing 8 -- Verbal;Tactile;Demo  -     Sets 8 -- 1#  -MH     Reps 8 -- 10/5  -MH     Time 8 -- 2 sec  -MH            Exercise 9    Exercise Name 9 -- SAQ  -MH     Cueing 9 -- Verbal;Tactile;Demo  -     Sets 9 -- 2  -MH     Reps 9 -- 10 each  -MH     Time 9 -- 5 sec  -     Additional Comments -- 1#   "-            Exercise 10    Exercise Name 10 -- standing calf raises  -     Cueing 10 -- Verbal;Tactile;Demo  [facility]     Reps 10 -- 20  -            Exercise 11    Exercise Name 11 -- sit to stand  hands on thighs and having him work on standing more upright once he stands up.  -     Cueing 11 -- Verbal;Tactile;Demo  -     Reps 11 -- 10  -            Exercise 12    Exercise Name 12 -- 6\" Forward step up  -     Cueing 12 -- Verbal;Demo  -     Reps 12 -- 10 each  -           User Key  (r) = Recorded By, (t) = Taken By, (c) = Cosigned By    Initials Name Provider Type     Rodolfo Salmeron PTA Physical Therapist Assistant                                 Therapy Education  Given: HEP, Symptoms/condition management  Program: Reinforced, Progressed, New  How Provided: Verbal, Demonstration  Provided to: Patient  Level of Understanding: Teach back education performed, Verbalized, Demonstrated              Time Calculation:   Start Time: 0858  Stop Time: 0936  Time Calculation (min): 38 min  Timed Charges  39268 - PT Therapeutic Exercise Minutes: 30  Total Minutes  Timed Charges Total Minutes: 30   Total Minutes: 30  Therapy Charges for Today     Code Description Service Date Service Provider Modifiers Qty    12806703083 HC PT THER PROC EA 15 MIN 3/29/2022 Rodolfo Salmeron PTA GP 2                    Rodolfo Salmeron PTA  3/29/2022     "

## 2022-03-30 ENCOUNTER — TELEPHONE (OUTPATIENT)
Dept: PAIN MEDICINE | Facility: CLINIC | Age: 82
End: 2022-03-30

## 2022-03-30 NOTE — TELEPHONE ENCOUNTER
Caller: COLETTE STEWART    Relationship to patient: SELF    Best call back number: [phone]      Additional notes:UNABLE TO WARM TRANSFER.  PATIENT CALLING TO SCHEDULE RFA

## 2022-03-31 ENCOUNTER — HOSPITAL ENCOUNTER (OUTPATIENT)
Dept: PHYSICAL THERAPY | Facility: HOSPITAL | Age: 82
Setting detail: THERAPIES SERIES
Discharge: HOME | End: 2022-03-31

## 2022-03-31 DIAGNOSIS — M51.36 DDD (DEGENERATIVE DISC DISEASE), LUMBAR: Primary | ICD-10-CM

## 2022-03-31 DIAGNOSIS — Z98.1 HX OF SPINAL FUSION: ICD-10-CM

## 2022-03-31 DIAGNOSIS — M48.062 SPINAL STENOSIS OF LUMBAR REGION WITH NEUROGENIC CLAUDICATION: ICD-10-CM

## 2022-03-31 PROCEDURE — 97110 THERAPEUTIC EXERCISES: CPT

## 2022-03-31 PROCEDURE — 97140 MANUAL THERAPY 1/> REGIONS: CPT

## 2022-04-01 ENCOUNTER — PREP FOR SURGERY (OUTPATIENT)
Dept: SURGERY | Facility: SURGERY CENTER | Age: 82
End: 2022-04-01

## 2022-04-01 DIAGNOSIS — M47.816 LUMBAR FACET ARTHROPATHY: Primary | ICD-10-CM

## 2022-04-01 RX ORDER — SODIUM CHLORIDE 0.9 % (FLUSH) 0.9 %
10 SYRINGE (ML) INJECTION EVERY 12 HOURS SCHEDULED
Status: CANCELLED | OUTPATIENT
Start: 2022-04-01

## 2022-04-01 RX ORDER — SODIUM CHLORIDE 0.9 % (FLUSH) 0.9 %
10 SYRINGE (ML) INJECTION AS NEEDED
Status: CANCELLED | OUTPATIENT
Start: 2022-04-01

## 2022-04-07 ENCOUNTER — HOSPITAL ENCOUNTER (OUTPATIENT)
Dept: PHYSICAL THERAPY | Facility: HOSPITAL | Age: 82
Setting detail: THERAPIES SERIES
Discharge: HOME | End: 2022-04-07

## 2022-04-07 DIAGNOSIS — M48.062 SPINAL STENOSIS OF LUMBAR REGION WITH NEUROGENIC CLAUDICATION: Primary | ICD-10-CM

## 2022-04-07 DIAGNOSIS — Z98.1 HX OF SPINAL FUSION: ICD-10-CM

## 2022-04-07 DIAGNOSIS — M51.36 DDD (DEGENERATIVE DISC DISEASE), LUMBAR: ICD-10-CM

## 2022-04-07 PROCEDURE — 97110 THERAPEUTIC EXERCISES: CPT

## 2022-04-07 NOTE — THERAPY TREATMENT NOTE
Outpatient Physical Therapy Ortho Treatment Note  AJ Sánchez     Patient Name: Frandy Perkins  : 1940  MRN: [id number]  Today's Date: 2022      Visit Date: 2022    Visit Dx:    ICD-10-CM ICD-9-CM   1. Spinal stenosis of lumbar region with neurogenic claudication  M48.062 724.03   2. Hx of spinal fusion  Z98.1 V45.4   3. DDD (degenerative disc disease), lumbar  M51.36 722.52       Patient Active Problem List   Diagnosis   • DDD (degenerative disc disease), lumbar   • Spinal stenosis of lumbar region with neurogenic claudication   • History of spinal fusion   • Lumbar facet arthropathy        Past Medical History:   Diagnosis Date   • Arthritis    • Hypertension         Past Surgical History:   Procedure Laterality Date   • BACK SURGERY      hx of 3 back surgeries   • ORTHOPEDIC SURGERY          PT Ortho     Row Name 22 0900       Subjective Comments    Subjective Comments pt states he still hasn't heard anything regarding his injection; no significant changes, good or bad, since last session  -       Precautions and Contraindications    Precautions hx of 3 lumbar surgeries; most recent in 2017  -       Subjective Pain    Able to rate subjective pain? yes  -    Pre-Treatment Pain Level 5  -    Subjective Pain Comment Increased to 7-8/10 with standing hip extension; mild improvement with seated rest break  -          User Key  (r) = Recorded By, (t) = Taken By, (c) = Cosigned By    Initials Name Provider Type     Rodolfo Salmeron, CHRISTIANO Physical Therapist Assistant                             PT Assessment/Plan     Row Name 22 0911          PT Assessment    Assessment Comments pt continues with LBP and FF posture; pt tolerated increased reps well except increased pain with hip extension  -            PT Plan    PT Plan Comments Cont per POC, progress as tolerated; consider wall squats as tolerated  -           User Key  (r) = Recorded By, (t) = Taken By, (c) =  Cosigned By    Initials Name Provider Type     Jaron Rodolfo Lee, PTA Physical Therapist Assistant                   OP Exercises     Row Name 04/07/22 1003 04/07/22 0900          Subjective Comments    Subjective Comments -- pt states he still hasn't heard anything regarding his injection; no significant changes, good or bad, since last session  -            Subjective Pain    Able to rate subjective pain? -- yes  -     Pre-Treatment Pain Level -- 5  -     Subjective Pain Comment -- Increased to 7-8/10 with standing hip extension; mild improvement with seated rest break  -            Total Minutes    81241 - PT Therapeutic Exercise Minutes 35  - --            Exercise 1    Exercise Name 1 -- PPT  -     Cueing 1 -- Verbal;Tactile;Demo  [facility]     Reps 1 -- 20  -     Time 1 -- 5 sec  -            Exercise 2    Exercise Name 2 -- Hooklying ball squeeze  -     Cueing 2 -- Verbal;Tactile;Demo  [facility]     Reps 2 -- 20  -     Time 2 -- 5 sec  -            Exercise 3    Exercise Name 3 -- Supine clam shell vs TB  -     Cueing 3 -- Verbal;Tactile;Demo  [facility]     Reps 3 -- 20 B; 20L; 20 R  -     Time 3 -- 5 sec  -     Additional Comments -- black  -            Exercise 4    Exercise Name 4 -- seated hip flexion with stomach muscles tight. seated on tony disc  -     Cueing 4 -- Verbal;Tactile;Demo  -     Sets 4 -- 2  -     Reps 4 -- 10  -     Time 4 -- 2 sec  -     Additional Comments -- 1# on each ankle  -            Exercise 5    Exercise Name 5 -- LAQ seated  on tony disc  -     Cueing 5 -- Verbal;Tactile;Demo  [facility]     Sets 5 -- 2  -     Reps 5 -- 10 each  -     Time 5 -- 2 sec  -     Additional Comments -- 1# on each ankle  -            Exercise 6    Exercise Name 6 -- standing hip extension  -     Cueing 6 -- Verbal;Tactile;Demo  [facility]     Sets 6 -- 2  -     Reps 6 -- 10 each  -     Time 6 -- 2 sec  -     Additional Comments -- 1#  -            Exercise 7    Exercise  "Name 7 -- standing hip abduction  -     Cueing 7 -- Verbal;Tactile;Demo  -     Sets 7 -- 2  -MH     Reps 7 -- 10 each  -MH     Time 7 -- 2 sec  -MH     Additional Comments -- 1# on each ankle  -MH            Exercise 8    Exercise Name 8 -- SLR  -MH     Cueing 8 -- Verbal;Tactile;Demo  -     Sets 8 -- 2  -MH     Reps 8 -- 10  -MH     Time 8 -- 2 sec  -MH     Additional Comments -- 1# on each ankle  -MH            Exercise 9    Exercise Name 9 -- SAQ  -     Cueing 9 -- Verbal;Tactile;Demo  -     Sets 9 -- 2  -MH     Reps 9 -- 10 each  -MH     Time 9 -- 5 sec  -MH     Additional Comments -- 1# on each ankle  -MH            Exercise 10    Exercise Name 10 -- standing calf raises  -     Cueing 10 -- Verbal;Tactile;Demo  -     Reps 10 -- 20  -MH            Exercise 11    Exercise Name 11 -- sit to stand  hands on thighs and having him work on standing more upright once he stands up.  -     Cueing 11 -- Verbal;Tactile;Demo  -     Reps 11 -- 10 table; 10 chair/DD  -            Exercise 12    Exercise Name 12 -- 6\" Forward step up  -     Cueing 12 -- Verbal;Demo  -     Reps 12 -- 10 each  -           User Key  (r) = Recorded By, (t) = Taken By, (c) = Cosigned By    Initials Name Provider Type    Rodolfo Samano PTA Physical Therapist Assistant                         Manual Rx (last 36 hours)     Manual Treatments     Row Name 04/07/22 0900             Manual Rx 1    Manual Rx 1 Location pelvis  -      Manual Rx 1 Type MET: shotgun/Right anterior innominate  -      Manual Rx 1 Duration Improved pelvic alignment noted after MET.  -            User Key  (r) = Recorded By, (t) = Taken By, (c) = Cosigned By    Initials Name Provider Type    Rodolfo Samano PTA Physical Therapist Assistant                    Therapy Education  Given: HEP, Symptoms/condition management  Program: Reinforced  How Provided: Verbal  Provided to: Patient  Level of Understanding: Teach back education performed, " Verbalized, Demonstrated              Time Calculation:   Start Time: 0855  Stop Time: 0936  Time Calculation (min): 41 min  Timed Charges  60729 - PT Therapeutic Exercise Minutes: 35  Total Minutes  Timed Charges Total Minutes: 35   Total Minutes: 35  Therapy Charges for Today     Code Description Service Date Service Provider Modifiers Qty    50148994321 HC PT THER PROC EA 15 MIN 4/7/2022 Rodolfo Salmeron, PTA GP 2                    Rodolfo Salmeron PTA  4/7/2022

## 2022-04-11 ENCOUNTER — TRANSCRIBE ORDERS (OUTPATIENT)
Dept: SURGERY | Facility: SURGERY CENTER | Age: 82
End: 2022-04-11

## 2022-04-11 DIAGNOSIS — Z41.9 SURGERY, ELECTIVE: Primary | ICD-10-CM

## 2022-04-12 ENCOUNTER — HOSPITAL ENCOUNTER (OUTPATIENT)
Dept: PHYSICAL THERAPY | Facility: HOSPITAL | Age: 82
Setting detail: THERAPIES SERIES
Discharge: HOME | End: 2022-04-12

## 2022-04-12 DIAGNOSIS — Z98.1 HX OF SPINAL FUSION: Primary | ICD-10-CM

## 2022-04-12 DIAGNOSIS — M51.36 DDD (DEGENERATIVE DISC DISEASE), LUMBAR: ICD-10-CM

## 2022-04-12 PROCEDURE — 97110 THERAPEUTIC EXERCISES: CPT

## 2022-04-12 NOTE — THERAPY TREATMENT NOTE
Outpatient Physical Therapy Ortho Treatment Note   Cecy Peace     Patient Name: Frandy Perkins  : 1940  MRN: [id number]  Today's Date: 2022      Visit Date: 2022    Visit Dx:    ICD-10-CM ICD-9-CM   1. Hx of spinal fusion  Z98.1 V45.4   2. DDD (degenerative disc disease), lumbar  M51.36 722.52       Patient Active Problem List   Diagnosis   • DDD (degenerative disc disease), lumbar   • Spinal stenosis of lumbar region with neurogenic claudication   • History of spinal fusion   • Lumbar facet arthropathy        Past Medical History:   Diagnosis Date   • Arthritis    • Hypertension         Past Surgical History:   Procedure Laterality Date   • BACK SURGERY      hx of 3 back surgeries   • ORTHOPEDIC SURGERY          PT Ortho     Row Name 22 09       Subjective Comments    Subjective Comments pt states no change since last session  -       Precautions and Contraindications    Precautions hx of 3 lumbar surgeries; most recent in 2017  -          User Key  (r) = Recorded By, (t) = Taken By, (c) = Cosigned By    Initials Name Provider Type     Rodolfo Salmeron PTA Physical Therapist Assistant                             PT Assessment/Plan     Row Name 22 1353          PT Assessment    Assessment Comments pt with minimal change in sympotms; pt feels he is standing taller at times; pt with increased pain today with ex's - able to complete all ex's/reps but with increased rest breaks; pt is scheduled for nerve ablation end of the month  -            PT Plan    PT Plan Comments Cont per POC  -           User Key  (r) = Recorded By, (t) = Taken By, (c) = Cosigned By    Initials Name Provider Type     Rodolfo Salmeron PTA Physical Therapist Assistant                   OP Exercises     Row Name 22 09             Subjective Comments    Subjective Comments pt states no change since last session  -              Exercise 1    Exercise Name 1 PPT  -      Cueing 1  "Verbal;Tactile;Demo  -MH      Reps 1 20  -MH      Time 1 5 sec  -MH              Exercise 2    Exercise Name 2 Hooklying ball squeeze  -MH      Cueing 2 Verbal;Tactile;Demo  -MH      Reps 2 20  -MH      Time 2 5 sec  -MH              Exercise 3    Exercise Name 3 Supine clam shell vs TB  -MH      Cueing 3 Verbal;Tactile;Demo  -MH      Reps 3 20 B; 20L; 20 R  -MH      Time 3 5 sec  -MH      Additional Comments black  -MH              Exercise 4    Exercise Name 4 (B) seated hip flexion with stomach muscles tight. seated on tony disc  -MH      Cueing 4 Verbal;Tactile;Demo  -MH      Sets 4 2  -MH      Reps 4 10  -MH      Time 4 2 sec  -MH      Additional Comments 1# on each ankle  -MH              Exercise 5    Exercise Name 5 (B) LAQ seated  on tony disc  -      Cueing 5 Verbal;Tactile;Demo  -MH      Reps 5 20 each  -MH      Time 5 2 sec  -MH              Exercise 6    Exercise Name 6 (B) standing hip extension  -MH      Cueing 6 Verbal;Tactile;Demo  -MH      Sets 6 2  -MH      Reps 6 10 each  -MH      Time 6 2 sec  -MH              Exercise 7    Exercise Name 7 (B) standing hip abduction  -MH      Cueing 7 Verbal;Tactile;Demo  -MH      Sets 7 2  -MH      Reps 7 10 each  -MH      Time 7 2 sec  -MH              Exercise 8    Exercise Name 8 SLR  -MH      Cueing 8 Verbal;Tactile;Demo  -MH      Sets 8 2  -MH      Reps 8 10  -MH      Time 8 2 sec  -MH              Exercise 9    Exercise Name 9 SAQ  -MH      Cueing 9 Verbal;Tactile;Demo  -MH      Sets 9 2  -MH      Reps 9 10 each  -MH      Time 9 5 sec  -MH              Exercise 10    Exercise Name 10 standing calf raises  -MH      Cueing 10 Verbal;Tactile;Demo  -MH      Reps 10 20  -MH              Exercise 11    Exercise Name 11 sit to stand  hands on thighs and having him work on standing more upright once he stands up.  -MH      Cueing 11 Verbal;Tactile;Demo  -MH      Sets 11 2  -MH      Reps 11 10  -MH              Exercise 12    Exercise Name 12 6\" Forward step up  " -MH      Cueing 12 Verbal  -MH      Reps 12 10 each  -MH      Additional Comments 1# on each ankle  -MH            User Key  (r) = Recorded By, (t) = Taken By, (c) = Cosigned By    Initials Name Provider Type    Rodolfo Samano PTA Physical Therapist Assistant                                 Therapy Education  Given: HEP  Program: Reinforced  How Provided: Verbal, Demonstration  Provided to: Patient  Level of Understanding: Teach back education performed, Verbalized, Demonstrated              Time Calculation:   Start Time: 0900  Stop Time: 0950  Time Calculation (min): 50 min  Therapy Charges for Today     Code Description Service Date Service Provider Modifiers Qty    08812757069 HC PT THER PROC EA 15 MIN 4/12/2022 Rodolfo Salmeron PTA GP 2                    Rodolfo Salmeron PTA  4/12/2022

## 2022-04-14 ENCOUNTER — HOSPITAL ENCOUNTER (OUTPATIENT)
Dept: PHYSICAL THERAPY | Facility: HOSPITAL | Age: 82
Setting detail: THERAPIES SERIES
Discharge: HOME | End: 2022-04-14

## 2022-04-14 DIAGNOSIS — M48.062 SPINAL STENOSIS OF LUMBAR REGION WITH NEUROGENIC CLAUDICATION: ICD-10-CM

## 2022-04-14 DIAGNOSIS — M51.36 DDD (DEGENERATIVE DISC DISEASE), LUMBAR: ICD-10-CM

## 2022-04-14 DIAGNOSIS — Z98.1 HX OF SPINAL FUSION: Primary | ICD-10-CM

## 2022-04-14 PROCEDURE — 97164 PT RE-EVAL EST PLAN CARE: CPT

## 2022-04-14 NOTE — THERAPY DISCHARGE NOTE
"     Outpatient Physical Therapy Ortho Re- Evaluation/Discharge  [facility]     Patient Name: Frandy Perkins  : 1940  MRN: [id number]  Today's Date: 2022      Visit Date: 2022    Patient Active Problem List   Diagnosis   • DDD (degenerative disc disease), lumbar   • Spinal stenosis of lumbar region with neurogenic claudication   • History of spinal fusion   • Lumbar facet arthropathy        Past Medical History:   Diagnosis Date   • Arthritis    • Hypertension         Past Surgical History:   Procedure Laterality Date   • BACK SURGERY      hx of 3 back surgeries   • ORTHOPEDIC SURGERY           Visit Dx:     ICD-10-CM ICD-9-CM   1. Hx of spinal fusion  Z98.1 V45.4   2. DDD (degenerative disc disease), lumbar  M51.36 722.52   3. Spinal stenosis of lumbar region with neurogenic claudication  M48.062 724.03            PT Ortho     Row Name 22 0900       Subjective Comments    Subjective Comments \"My back feels about the same.\" \"I believe my legs are a little stronger.\" \"I go for the beginning of the nerve ablation on 22.\"  -LN       Precautions and Contraindications    Precautions hx of 3 lumbar surgeries; most recent in 2017  -LN       Subjective Pain    Able to rate subjective pain? yes  -LN    Pre-Treatment Pain Level 5  -LN    Subjective Pain Comment increased pain with standing hip exercises, but doesn't last long  -LN       Posture/Observations    Posture/Observations Comments Patient continues to stand and walk in significant FF posture- about 40-50 degrees trunk flexion  -LN       Myotomal Screen- Lower Quarter Clearing    Hip flexion (L2) Bilateral:;5 (Normal)  -LN    Knee extension (L3) Bilateral:;5 (Normal)  -LN    Ankle DF (L4) Right:;4+ (Good +);Left:;5 (Normal)  -LN    Ankle PF (S1) Right:;4+ (Good +);Left:;5 (Normal)  -LN    Knee flexion (S2) Bilateral:;5 (Normal)  -LN       Head/Neck/Trunk    Trunk Extension AROM -10 degrees- mild discomfort  -LN    Trunk Flexion AROM " WFL- mild discomfort  -LN    Trunk Lt Lateral Flexion AROM 75%- no pain  -LN    Trunk Rt Lateral Flexion AROM 75%- no pain  -LN    Trunk Lt Rotation AROM 75%- no pain  -LN    Trunk Rt Rotation AROM 75%- no pain  -LN       MMT Neck/Trunk    Trunk Flexion MMT, Gross Movement (4/5) good  -LN    Trunk Extension MMT, Gross Movement (3+/5) fair plus  -LN       MMT Right Lower Ext    Rt Hip Flexion MMT, Gross Movement (5/5) normal  -LN    Rt Hip Extension MMT, Gross Movement (5/5) normal  -LN    Rt Hip ABduction MMT, Gross Movement (4+/5) good plus  -LN    Rt Hip ADduction MMT, Gross Movement (4+/5) good plus  -LN    Rt Knee Extension MMT, Gross Movement (5/5) normal  -LN    Rt Knee Flexion MMT, Gross Movement (5/5) normal  -LN    Rt Ankle Plantarflexion MMT, Gross Movement (4+/5) good plus  -LN    Rt Ankle Dorsiflexion MMT, Gross Movement (4+/5) good plus  -LN       MMT Left Lower Ext    Lt Hip Flexion MMT, Gross Movement (5/5) normal  -LN    Lt Hip Extension MMT, Gross Movement (5/5) normal  -LN    Lt Hip ABduction MMT, Gross Movement (4+/5) good plus  -LN    Lt Hip ADduction MMT, Gross Movement (4+/5) good plus  -LN    Lt Knee Extension MMT, Gross Movement (5/5) normal  -LN    Lt Knee Flexion MMT, Gross Movement (5/5) normal  -LN    Lt Ankle Plantarflexion MMT, Gross Movement (5/5) normal  -LN    Lt Ankle Dorsiflexion MMT, Gross Movement (5/5) normal  -LN       Sensation    Sensation WNL? WNL  -LN    Light Touch No apparent deficits  -LN       Lower Extremity Flexibility    Hamstrings Bilateral:;Mildly limited  -LN    ITB Bilateral:;Mildly limited  -LN    Gastrocnemius Bilateral:;Mildly limited  -LN    Soleus Bilateral:;Mildly limited  -LN       Gait/Stairs (Locomotion)    [place] Level (Gait) independent  -LN    Assistive Device (Gait) cane, straight  -LN    Deviations/Abnormal Patterns (Gait) bilateral deviations;antalgic  improved gait speed noted  -LN    Bilateral Gait Deviations forward flexed posture  -LN     "Comment, (Gait/Stairs) Patient continues to ambulate with SPC with significant trunk FF posture and with hip and knee flexion- he reports \"I have walked with my knees bent for like 25 years.\"  -LN          User Key  (r) = Recorded By, (t) = Taken By, (c) = Cosigned By    Initials Name Provider Type    Judy Ventura, PT Physical Therapist                             Therapy Education  Education Details: Pt to continue with HEP 1 x day and use HP PRN. He is to continue to work on trunk extension in sitting with arms crossed in front of his body & also to continue to work on standing more upright  in front of a mirror.  Given: HEP, Symptoms/condition management  Program: New, Reinforced (added seated trunk extension)  How Provided: Verbal, Demonstration, Written  Provided to: Patient  Level of Understanding: Teach back education performed, Verbalized, Demonstrated     PT OP Goals     Row Name 04/14/22 0900          PT Short Term Goals    STG Date to Achieve --  -LN     STG 1 Patient to verbally report decreased pain in low back and hips to <5/10 with ADLs and everyday activities.  -LN     STG 1 Progress Not Met  -LN     STG 1 Progress Comments He consistently rates LBP at 5/10.  -LN     STG 2 Patient independent with initial HEP issued by therapist.  -LN     STG 2 Progress Met  -LN     STG 3 Trunk ROM improved by 25% to allow for improved functional mobility and gait.  -LN     STG 3 Progress Not Met  -LN     STG 4 Patient able to stand with improved trunk extension; improved to only 20 degrees trunk flexion.  -LN     STG 4 Progress Partially Met  -LN     STG 4 Progress Comments He can  10 degrees trunk extension with cueing but not able to maintain with standing and walking- tends to be in about 40-50 degrees trunk extension  -LN     STG 5 Patient able to maintain good pelvic alignment between PT sessions with use of MET and core stabilization exercises.  -LN     STG 5 Progress Not Met  -LN     STG " 6 Patient to have improved standing tolerance to 20-30 minutes.  -LN     STG 6 Progress Met  -LN            Long Term Goals    LTG Date to Achieve --  -LN     LTG 1 Patient to verbally report decreased pain in low back and hips to <3/10 with ADLs and everyday activities.  -LN     LTG 1 Progress Met  -LN     LTG 1 Progress Comments He consistently rates LBP at 5/10.  -LN     LTG 2 Patient independent with more advanced HEP issued by therapist.  -LN     LTG 2 Progress Met  -LN     LTG 3 Patient able to stand with only 10 degrees or less trunk flexion with no c/o any increased LBP.  -LN     LTG 3 Progress Not Met  -LN     LTG 4 B LE strength improved to 5/5 all planes.  -LN     LTG 4 Progress Partially Met  -LN     LTG 4 Progress Comments met except for B hip abduction/adduction and right ankle df & pf= 4+/5 ( all improved).  -LN     LTG 5 Core strength improved to 4/5 trunk flexion and 4- trunk extension.  -LN     LTG 5 Progress Partially Met  -LN     LTG 5 Progress Comments met for trunk flexion; trunk extension= 3+/5.  -LN     LTG 6 Patient able to walk 1/2 mile without any increased LBP.  -LN     LTG 6 Progress Not Met  -LN     LTG 6 Progress Comments He has not done any walking, so unable to assess.  -           User Key  (r) = Recorded By, (t) = Taken By, (c) = Cosigned By    Initials Name Provider Type    Judy Ventura, PT Physical Therapist                 PT Assessment/Plan     Row Name 04/14/22 0900          PT Assessment    Assessment Comments Patient with no change in LBP but he does have improved B LE strength, but still with a little weakness in B hip abd/adduction and right ankle df & pf; but all improved. Improved strength in trunk flexion & trunk extension, but still with trunk extension weakness and he still stands and walks in significant trunk FF posture, but does seem improved by about 10 degrees. He also still stands and walks in hip & knee flexion; he reports that he has walked  "with his knees bent for about 25 years. He is independent with HEP. Patient has met 2 out of 6 STG and partially met 1 STG and he has met 2 out of 6 LTG and partially met 2 LTG. At this time, feel he is ready for discharge with HEP and pt is in agreement. He is scheduled for a nerve ablation at the end of this month, which should hopefully help decrease his LBP. Improved pelvic alignment overall, but still needed MET.  -LN            PT Plan    PT Plan Comments Discharge with HEP at this time; patient in agreement. Patient to call with any questions or concerns or if he feels like he needs any further PT in the future or after his nerve ablation as needed (will need new MD order if any further PT is ordered- patient aware).  -LN           User Key  (r) = Recorded By, (t) = Taken By, (c) = Cosigned By    Initials Name Provider Type    Judy Ventura, PT Physical Therapist                 OP Exercises     Row Name 04/14/22 0900             Subjective Comments    Subjective Comments \"My back feels about the same.\" \"I believe my legs are a little stronger.\" \"I go for the beginning of the nerve ablation on 4/27/22.\"  -LN              Subjective Pain    Able to rate subjective pain? yes  -LN      Pre-Treatment Pain Level 5  -LN      Subjective Pain Comment increased pain with standing hip exercises, but doesn't last long  -LN              Total Minutes    79286 - PT Manual Therapy Minutes 5  -LN              Exercise 1    Exercise Name 1 verbally reviewed his HEP and all questions answered.  -LN      Cueing 1 --  -LN      Reps 1 --  -LN      Time 1 --  -LN              Exercise 2    Exercise Name 2 seated trunk extension  -LN      Cueing 2 Verbal;Tactile;Demo  -LN      Reps 2 10  -LN      Time 2 5 sec  -LN      Additional Comments with arms crossed in front of chest  -LN              Exercise 3    Exercise Name 3 --  -LN      Cueing 3 --  -LN      Reps 3 --  -LN      Time 3 --  -LN      Additional Comments --  " -LN              Exercise 4    Exercise Name 4 --  -LN      Cueing 4 --  -LN      Sets 4 --  -LN      Reps 4 --  -LN      Time 4 --  -LN              Exercise 5    Exercise Name 5 --  -LN      Cueing 5 --  -LN      Reps 5 --  -LN      Time 5 --  -LN              Exercise 6    Exercise Name 6 --  -LN      Cueing 6 --  -LN      Sets 6 --  -LN      Reps 6 --  -LN      Time 6 --  -LN              Exercise 7    Exercise Name 7 --  -LN      Cueing 7 --  -LN      Sets 7 --  -LN      Reps 7 --  -LN      Time 7 --  -LN              Exercise 8    Exercise Name 8 --  -LN      Cueing 8 --  -LN      Sets 8 --  -LN      Reps 8 --  -LN      Time 8 --  -LN              Exercise 9    Exercise Name 9 --  -LN      Cueing 9 --  -LN      Sets 9 --  -LN      Reps 9 --  -LN      Time 9 --  -LN              Exercise 10    Exercise Name 10 --  -LN      Cueing 10 --  -LN      Reps 10 --  -LN              Exercise 11    Exercise Name 11 --  -LN      Cueing 11 --  -LN      Sets 11 --  -LN      Reps 11 --  -LN              Exercise 12    Exercise Name 12 --  -LN      Cueing 12 --  -LN      Reps 12 --  -LN            User Key  (r) = Recorded By, (t) = Taken By, (c) = Cosigned By    Initials Name Provider Type    Judy Ventura, PT Physical Therapist              Manual Rx (last 36 hours)     Manual Treatments     Row Name 04/14/22 0900             Total Minutes    61225 - PT Manual Therapy Minutes 5  -LN              Manual Rx 1    Manual Rx 1 Location pelvis  -LN      Manual Rx 1 Type MET: shotgun/Right anterior innominate  -LN      Manual Rx 1 Duration Good pelvic alignment noted after MET & overall improved.  -LN            User Key  (r) = Recorded By, (t) = Taken By, (c) = Cosigned By    Initials Name Provider Type    Judy Ventura, MICKEY Physical Therapist                                     Time Calculation:   Start Time: 0900  Stop Time: 0925  Time Calculation (min): 25 min  Timed Charges  58433 - PT Manual Therapy  Minutes: 5  Untimed Charges  PT Eval/Re-eval Minutes: 25  Total Minutes  Timed Charges Total Minutes: 5  Untimed Charges Total Minutes: 25   Total Minutes: 25  Therapy Charges for Today     Code Description Service Date Service Provider Modifiers Qty    40287469148 HC PT RE-EVAL ESTABLISHED PLAN 2 4/14/2022 Judy Wan, PT GP 1                OP PT Discharge Summary  Date of Discharge: 04/14/22  Reason for Discharge: Independent (Pt in agreement at discharge with HEP at this time.)  Outcomes Achieved: Patient able to partially acheive established goals  Discharge Destination: Home with home program  Discharge Instructions/Additional Comments: Discharge with HEP at this time. Pt to call with any questions or concerns. If he feels like he needs any further PT in the future or after his nerve ablation, patient to get a new MD order and contact PT.        Judy Wan, PT  4/14/2022

## 2022-04-27 ENCOUNTER — HOSPITAL ENCOUNTER (OUTPATIENT)
Dept: GENERAL RADIOLOGY | Facility: SURGERY CENTER | Age: 82
Setting detail: HOSPITAL OUTPATIENT SURGERY
End: 2022-04-27

## 2022-04-27 ENCOUNTER — HOSPITAL ENCOUNTER (OUTPATIENT)
Facility: SURGERY CENTER | Age: 82
Setting detail: HOSPITAL OUTPATIENT SURGERY
Discharge: HOME | End: 2022-04-27
Attending: ANESTHESIOLOGY | Admitting: ANESTHESIOLOGY

## 2022-04-27 VITALS
TEMPERATURE: 97.7 F | RESPIRATION RATE: 17 BRPM | BODY MASS INDEX: 29.4 KG/M2 | OXYGEN SATURATION: 99 % | HEIGHT: 71 IN | SYSTOLIC BLOOD PRESSURE: 123 MMHG | DIASTOLIC BLOOD PRESSURE: 72 MMHG | WEIGHT: 210 LBS | HEART RATE: 77 BPM

## 2022-04-27 DIAGNOSIS — Z41.9 SURGERY, ELECTIVE: ICD-10-CM

## 2022-04-27 DIAGNOSIS — M47.816 LUMBAR FACET ARTHROPATHY: ICD-10-CM

## 2022-04-27 PROCEDURE — 64493 INJ PARAVERT F JNT L/S 1 LEV: CPT | Performed by: ANESTHESIOLOGY

## 2022-04-27 PROCEDURE — 64495 INJ PARAVERT F JNT L/S 3 LEV: CPT | Performed by: ANESTHESIOLOGY

## 2022-04-27 PROCEDURE — 64494 INJ PARAVERT F JNT L/S 2 LEV: CPT | Performed by: ANESTHESIOLOGY

## 2022-04-27 PROCEDURE — 77002 NEEDLE LOCALIZATION BY XRAY: CPT

## 2022-04-27 PROCEDURE — 76000 FLUOROSCOPY <1 HR PHYS/QHP: CPT

## 2022-04-27 RX ORDER — SODIUM CHLORIDE 0.9 % (FLUSH) 0.9 %
10 SYRINGE (ML) INJECTION EVERY 12 HOURS SCHEDULED
Status: DISCONTINUED | OUTPATIENT
Start: 2022-04-27 | End: 2022-04-27 | Stop reason: HOSPADM

## 2022-04-27 RX ORDER — SODIUM CHLORIDE 0.9 % (FLUSH) 0.9 %
10 SYRINGE (ML) INJECTION AS NEEDED
Status: DISCONTINUED | OUTPATIENT
Start: 2022-04-27 | End: 2022-04-27 | Stop reason: HOSPADM

## 2022-05-04 ENCOUNTER — PREP FOR SURGERY (OUTPATIENT)
Dept: SURGERY | Facility: SURGERY CENTER | Age: 82
End: 2022-05-04

## 2022-05-04 ENCOUNTER — TELEPHONE (OUTPATIENT)
Dept: PAIN MEDICINE | Facility: CLINIC | Age: 82
End: 2022-05-04

## 2022-05-04 DIAGNOSIS — M47.816 LUMBAR FACET ARTHROPATHY: Primary | ICD-10-CM

## 2022-05-04 RX ORDER — SODIUM CHLORIDE 0.9 % (FLUSH) 0.9 %
10 SYRINGE (ML) INJECTION AS NEEDED
Status: CANCELLED | OUTPATIENT
Start: 2022-05-04

## 2022-05-04 RX ORDER — SODIUM CHLORIDE 0.9 % (FLUSH) 0.9 %
10 SYRINGE (ML) INJECTION EVERY 12 HOURS SCHEDULED
Status: CANCELLED | OUTPATIENT
Start: 2022-05-04

## 2022-05-04 NOTE — TELEPHONE ENCOUNTER
Provider: FLORES  Caller: AMARIS    Phone Number: [phone] [place]  [phone] [place]  Reason for Call: PT IS CALLING TO MAKE SURE HE GET SCHEDULE FOR THE NEXT PART OF HIS LUMBAR BRANCH BLOCK

## 2022-05-04 NOTE — TELEPHONE ENCOUNTER
Order placed.  Can we get him scheduled?  May need to change follow-up with Virgie unless we can get him in before that.

## 2022-05-04 NOTE — TELEPHONE ENCOUNTER
Can you ask him if he had at least 80% relief after the injections and for how long?  If he did, I can place the order for the next MBB.

## 2022-05-05 ENCOUNTER — TRANSCRIBE ORDERS (OUTPATIENT)
Dept: SURGERY | Facility: SURGERY CENTER | Age: 82
End: 2022-05-05

## 2022-05-05 DIAGNOSIS — M47.816 LUMBAR FACET ARTHROPATHY: Primary | ICD-10-CM

## 2022-05-06 NOTE — TELEPHONE ENCOUNTER
Mr. Perkins's procedure is scheduled for 5/13/2022 and his follow-up appt has been moved to 5/17/2022.

## 2022-05-10 NOTE — SIGNIFICANT NOTE
Patient educated on the following :    - If you are receiving Sedation for your procedure Nothing to Eat 6 hours and only clear liquids for 2 hours prior to your procedure.     -You will need to have someone drive you home after your PROCEDURE and remain with you for 24 hours after the PROCEDURE  - The date of your procedure, your are welcome to have one visitor at bedside or remain within 10-15 minutes of [facility]  -You will need to arrive at 0845 on 5/13 PROCEDURE  -Please contact [facility] at: [phone] with any questions and/or concerns

## 2022-05-12 NOTE — DISCHARGE INSTRUCTIONS
[facility] Pain Management - Post-procedure Instructions          --  While there are no absolute restrictions, it is recommended that you do not perform strenuous activity today. In the morning, you may resume your level of activity as before your block.    --  If you have a band-aid at your injection site, please remove it later today. Observe the area for any redness, swelling, pus-like drainage, or a temperature over 101°. If any of these symptoms occur, please call your doctor at [phone]. If after office hours, leave a message and the on-call provider will return your call.    --  Ice may be applied to your injection site. It is recommended you avoid direct heat (heating pad; hot tub) for 1-2 days.    --  Call [facility] at [phone] if you experience persistent headache, persistent bleeding from the injection site, or severe pain not relieved by heat or oral medication.    --  Do not make important decisions today.    --  Due to the effects of the block and/or the I.V. Sedation, DO NOT drive or operate hazardous machinery for 12 hours.  Local anesthetics may cause numbness after procedure and precautions must be taken with regards to operating equipment as well as with walking, even if ambulating with assistance of another person or with an assistive device.    --  Do not drink alcohol for 12 hours.    -- You may return to work tomorrow, or as directed by your referring doctor.    --  Occasionally you may notice a slight increase in your pain after the procedure. This should start to improve within the next 24-48 hours. Radiofrequency ablation procedure pain may last 3-4 weeks.    --  It may take as long as 3-4 days before you notice a gradual improvement in your pain and/or other symptoms.    -- You may continue to take your prescribed pain medication as needed.    --  Some normal possible side effects of steroid use could include fluid retention, increased blood sugar, dull headache,  increased sweating, increased appetite, mood swings and flushing.    --  Diabetics are recommended to watch their blood glucose level closely for 24-48 hours after the injection.    --  Must stay in PACU for 20 min upon arrival and prove no leg weakness before being discharged.    --  IN THE EVENT OF A LIFE THREATENING EMERGENCY, (CHEST PAIN, BREATHING DIFFICULTIES, PARALYSIS…) YOU SHOULD GO TO YOUR NEAREST EMERGENCY ROOM.    --  You should be contacted by our office within 2-3 days to schedule follow up or next appointment date.  If not contacted within 7 days, please call the office at [phone]    If you have even 1 hour of relief, these injections are considered successful.

## 2022-05-13 ENCOUNTER — HOSPITAL ENCOUNTER (OUTPATIENT)
Facility: SURGERY CENTER | Age: 82
Setting detail: HOSPITAL OUTPATIENT SURGERY
Discharge: HOME | End: 2022-05-13
Attending: ANESTHESIOLOGY | Admitting: ANESTHESIOLOGY

## 2022-05-13 ENCOUNTER — HOSPITAL ENCOUNTER (OUTPATIENT)
Dept: GENERAL RADIOLOGY | Facility: SURGERY CENTER | Age: 82
Setting detail: HOSPITAL OUTPATIENT SURGERY
End: 2022-05-13

## 2022-05-13 VITALS
HEIGHT: 71 IN | BODY MASS INDEX: 29.4 KG/M2 | DIASTOLIC BLOOD PRESSURE: 63 MMHG | RESPIRATION RATE: 16 BRPM | HEART RATE: 60 BPM | OXYGEN SATURATION: 97 % | SYSTOLIC BLOOD PRESSURE: 122 MMHG | TEMPERATURE: 97.8 F | WEIGHT: 210 LBS

## 2022-05-13 DIAGNOSIS — M47.816 LUMBAR FACET ARTHROPATHY: ICD-10-CM

## 2022-05-13 PROCEDURE — 77002 NEEDLE LOCALIZATION BY XRAY: CPT

## 2022-05-13 PROCEDURE — 76000 FLUOROSCOPY <1 HR PHYS/QHP: CPT

## 2022-05-13 PROCEDURE — 64494 INJ PARAVERT F JNT L/S 2 LEV: CPT | Performed by: ANESTHESIOLOGY

## 2022-05-13 PROCEDURE — 64493 INJ PARAVERT F JNT L/S 1 LEV: CPT | Performed by: ANESTHESIOLOGY

## 2022-05-13 RX ORDER — SODIUM CHLORIDE 0.9 % (FLUSH) 0.9 %
10 SYRINGE (ML) INJECTION EVERY 12 HOURS SCHEDULED
Status: DISCONTINUED | OUTPATIENT
Start: 2022-05-13 | End: 2022-05-13 | Stop reason: HOSPADM

## 2022-05-13 RX ORDER — SODIUM CHLORIDE 0.9 % (FLUSH) 0.9 %
10 SYRINGE (ML) INJECTION AS NEEDED
Status: DISCONTINUED | OUTPATIENT
Start: 2022-05-13 | End: 2022-05-13 | Stop reason: HOSPADM

## 2022-05-13 NOTE — OP NOTE
Bilateral L4-S1 Lumbar Medial Branch Blockade  [facility]      PREOPERATIVE DIAGNOSIS:  Lumbar spondylosis without myelopathy    POSTOPERATIVE DIAGNOSIS:  Lumbar spondylosis without myelopathy    PROCEDURE:   Diagnostic Lumbar Medial Branch Nerve Blockades, with fluoroscopy:  at the L4, L5 transverse processes and the sacral alar groove)   1. 46036-89 -- Lumbar Facet blocks, 1st Level  2. 60372-05 -- Lumbar Facet blocks, 2nd  Level     PRE-PROCEDURE DISCUSSION WITH PATIENT:    Risks and complications were discussed with the patient prior to starting the procedure and informed consent was obtained.      SURGEON:  Lashae Cruz MD    REASON FOR PROCEDURE:    The patient complains of pain that seems to have a significant axial component and Previous diagnostic positivity of a Lumbar Medial Branch Blockade at the same levels    SEDATION:  Patient declined administration of moderate sedation    ANESTHETIC:  0.5% bupivacaine  STEROID:  None  TOTAL VOLUME OF SOLUTION:  6ml    DESCRIPTON OF PROCEDURE:  After obtaining informed consent, IV access was not obtained in the preoperative area.   The patient was taken to the operating room.  The patient was placed in the prone position with a pillow under the abdomen. All pressure points were well padded.  EKG, blood pressure, and pulse oximeter were monitored.  The patient was monitored and sedated by the RN under my direction. The lumbosacral area was prepped with Chloraprep and draped in a sterile fashion.     AP fluoroscopic image was used to visualize the junction of the right S1 superior articular process with the sacral ala.  The skin and subcutaneous tissue over the area was anesthetized with 1% lidocaine.  A 22-gauge spinal needle was then advanced percutaneously through the anesthetized skin tract under fluoroscopic guidance in a coaxial view to contact periosteum.  After negative aspiration, a volume of 1 mL of the local anesthetic was injected  without resistance.  The image was then optimized to maximize visualization of the junctions of the L4, L5 superior articular processes with the transverse processes.  The skin and subcutaneous tissue over the areas was anesthetized with 1% lidocaine.  A 22-gauge spinal needle was then advanced percutaneously through the anesthetized skin tracts under fluoroscopic guidance in a coaxial view to contact periosteum at the target sites.  After negative aspiration, a volume of 1 mL of the local anesthetic  was injected without resistance at each of the target sites.      The same procedure was then performed on the contralateral side in the exact same fashion.        Onset of analgesia was noted.  Vital signs remained stable throughout.      ESTIMATED BLOOD LOSS:  <5 mL  SPECIMENS:  none    COMPLICATIONS:   No complications were noted.    TOLERANCE & DISCHARGE CONDITION:    The patient tolerated the procedure well.  The patient was transported to the recovery area without difficulties.  The patient was discharged to home under the care of family in stable and satisfactory condition.    Pre-procedure pain score: 5/10 at rest, 7/10 with activity  Post-procedure pain score: 0/10    PLAN OF CARE:  1. The patient was given our standard instruction sheet.  2. We discussed that Lumbar Medial Branch Blockade is a diagnostic procedure in consideration for radiofrequency ablation if two diagnostic procedures prove to be positive for significant benefit.  An alternative plan could be therapeutic lumbar branch blockades.  3. The patient is asked to keep an account of pain relief after the procedure today.  4. The patient will  Return to clinic 1-2 wks.  5. The patient will resume all medications as per the medication reconciliation sheet.

## 2022-05-13 NOTE — H&P
[facility]   HISTORY AND PHYSICAL    Patient Name: Frandy Perkins  : 1940  MRN: [id number]  Primary Care Physician:  Provider, No Known  Date of admission: 2022    Subjective   Subjective     Chief Complaint: Low back pain    Continued aching/stiff low back pain without radicular symptoms.      Review of Systems   Constitutional: Negative for chills and fever.   Respiratory: Negative for cough and shortness of breath.    Musculoskeletal: Positive for back pain.        Personal History     Past Medical History:   Diagnosis Date   • Arthritis    • Hypertension        Past Surgical History:   Procedure Laterality Date   • BACK SURGERY      hx of 3 back surgeries   • MEDIAL BRANCH BLOCK Bilateral 2022    Procedure: Lumbar MEDIAL BRANCH BLOCK at approximately L3-L5 Verify painful levels under fluoro;  Surgeon: Lashae Cruz MD;  Location: [facility] MAIN OR;  Service: Pain Management;  Laterality: Bilateral;   • ORTHOPEDIC SURGERY         Family History: family history is not on file. Otherwise pertinent FHx was reviewed and not pertinent to current issue.    Social History:  reports that he has quit smoking. He has quit using smokeless tobacco. He reports current alcohol use. He reports that he does not use drugs.    Home Medications:  Docusate Sodium, acetaminophen, gabapentin, hydroCHLOROthiazide, lisinopril, meloxicam, sildenafil, simvastatin, verapamil SR, and vitamin B-12    Allergies:  No Known Allergies    Objective    Objective     Vitals:        Physical Exam  Constitutional:       General: He is not in acute distress.  Pulmonary:      Effort: Pulmonary effort is normal. No respiratory distress.   Skin:     General: Skin is warm and dry.   Neurological:      Mental Status: He is alert.   Psychiatric:         Mood and Affect: Mood normal.         Thought Content: Thought content normal.         Result Review    Result Review:  I have personally reviewed the results from the time of this  admission to 5/13/2022 08:38 EDT and agree with these findings:  []  Laboratory  []  Microbiology  []  Radiology  []  EKG/Telemetry   []  Cardiology/Vascular   []  Pathology  []  Old records  []  Other:  Most notable findings include: No new    Assessment & Plan   Assessment / Plan     Brief Patient Summary:  Frandy Perkins is a 81 y.o. male who continues to have low back pain    Active Hospital Problems:  Active Hospital Problems    Diagnosis    • **Lumbar facet arthropathy      Plan:  Bilateral L4-S1 Medial branch blocks #2      DVT prophylaxis:  No DVT prophylaxis order currently exists.    CODE STATUS:         Lashae Cruz MD

## 2022-05-17 ENCOUNTER — OFFICE VISIT (OUTPATIENT)
Dept: PAIN MEDICINE | Facility: CLINIC | Age: 82
End: 2022-05-17

## 2022-05-17 ENCOUNTER — PREP FOR SURGERY (OUTPATIENT)
Dept: SURGERY | Facility: SURGERY CENTER | Age: 82
End: 2022-05-17

## 2022-05-17 VITALS
WEIGHT: 211.4 LBS | RESPIRATION RATE: 12 BRPM | HEART RATE: 85 BPM | BODY MASS INDEX: 29.6 KG/M2 | TEMPERATURE: 97.8 F | HEIGHT: 71 IN | SYSTOLIC BLOOD PRESSURE: 127 MMHG | DIASTOLIC BLOOD PRESSURE: 76 MMHG | OXYGEN SATURATION: 98 %

## 2022-05-17 DIAGNOSIS — M47.816 LUMBAR FACET ARTHROPATHY: Primary | ICD-10-CM

## 2022-05-17 DIAGNOSIS — Z98.1 HISTORY OF SPINAL FUSION: ICD-10-CM

## 2022-05-17 DIAGNOSIS — M51.36 DDD (DEGENERATIVE DISC DISEASE), LUMBAR: ICD-10-CM

## 2022-05-17 DIAGNOSIS — M48.062 SPINAL STENOSIS OF LUMBAR REGION WITH NEUROGENIC CLAUDICATION: ICD-10-CM

## 2022-05-17 PROCEDURE — 99214 OFFICE O/P EST MOD 30 MIN: CPT | Performed by: NURSE PRACTITIONER

## 2022-05-17 RX ORDER — SODIUM CHLORIDE 0.9 % (FLUSH) 0.9 %
10 SYRINGE (ML) INJECTION EVERY 12 HOURS SCHEDULED
Status: CANCELLED | OUTPATIENT
Start: 2022-05-17

## 2022-05-17 RX ORDER — SODIUM CHLORIDE 0.9 % (FLUSH) 0.9 %
10 SYRINGE (ML) INJECTION AS NEEDED
Status: CANCELLED | OUTPATIENT
Start: 2022-05-17

## 2022-05-17 NOTE — PROGRESS NOTES
CHIEF COMPLAINT  PROCEDURE FOLLOW UP LUMBAR MEDIAL BRANCH BLOCK Bilateral L4-S1.  Patient in office reports he only noticed relief for a couple of minutes then went back to baseline .     Subjective   Frandy Perkins is a 81 y.o. male  who presents to the office for follow-up of procedure.  He completed a Bilateral L4-S1 MBB   on  5/13/2022 and 4/27/2022 performed by Dr. Cruz for management of back pain. Patient reports 80% relief from the first procedure x ~4 hours. He reports very little relief from the second procedure.     Today his pain is 6/10VAS in severity. His primary pain complaint remains in his lumbo-sacral area with particular pain today over the left sacroiliac joint.     He continues in PT and states that his therapy is getting easier.     Previously completed epidurals with minimal relief.  He has a history of lumbar surgery x3 (2012, 2016, 2017 with Dr. Damon).  He is previously a patient [facility] pain management.    Patient remained masked during entire encounter. No cough present. I donned a mask and eye protection throughout entire visit. Prior to donning mask and eye protection, hand hygiene was performed, as well as when it was doffed.  I was closer than 6 feet, but not for an extended period of time. No obvious exposure to any bodily fluids.    Back Pain  This is a chronic problem. The current episode started more than 1 year ago. The problem occurs constantly. The problem has been gradually worsening since onset. The pain is present in the lumbar spine and sacro-iliac. The quality of the pain is described as aching. Radiates to: bilateral hips. The pain is at a severity of 6/10. The pain is worse during the day. The symptoms are aggravated by bending, standing and twisting (walking). Stiffness is present in the morning. Associated symptoms include weakness. Pertinent negatives include no abdominal pain, chest pain, dysuria, fever, headaches or numbness. He has tried NSAIDs, heat and  "chiropractic manipulation (Tylenol) for the symptoms.      PEG Assessment   What number best describes your pain on average in the past week?8  What number best describes how, during the past week, pain has interfered with your enjoyment of life?8  What number best describes how, during the past week, pain has interfered with your general activity?  8    The following portions of the patient's history were reviewed and updated as appropriate: allergies, current medications, past family history, past medical history, past social history, past surgical history and problem list.    Review of Systems   Constitutional: Negative for activity change, fatigue and fever.   HENT: Negative for congestion.    Eyes: Negative for visual disturbance.   Respiratory: Negative for cough and chest tightness.    Cardiovascular: Negative for chest pain.   Gastrointestinal: Negative for abdominal pain, constipation and diarrhea.   Genitourinary: Negative for difficulty urinating and dysuria.   Musculoskeletal: Positive for back pain.   Neurological: Positive for weakness. Negative for dizziness, light-headedness, numbness and headaches.   Psychiatric/Behavioral: Negative for agitation, sleep disturbance and suicidal ideas. The patient is not nervous/anxious.      --  The aforementioned information the Chief Complaint section and above subjective data including any HPI data, and also the Review of Systems data, has been personally reviewed and affirmed.  --     Vitals:    05/17/22 1525   BP: 127/76   BP Location: Left arm   Patient Position: Sitting   Pulse: 85   Resp: 12   Temp: 97.8 °F (36.6 °C)   SpO2: 98%   Weight: 95.9 kg (211 lb 6.4 oz)   Height: 180.3 cm (71\")   PainSc:   6   PainLoc: Back     Objective   Physical Exam  Vitals and nursing note reviewed.   Constitutional:       Appearance: Normal appearance. He is well-developed.   Eyes:      General: Lids are normal.   Cardiovascular:      Rate and Rhythm: Normal rate.   Pulmonary: "      Effort: Pulmonary effort is normal.   Musculoskeletal:      Lumbar back: Tenderness and bony tenderness present. Decreased range of motion.      Comments: +Left SI Compression  +Left Michael  -Left Thigh Thrust  +Lumbar facet loading   Neurological:      Mental Status: He is alert and oriented to person, place, and time.   Psychiatric:         Attention and Perception: Attention normal.         Mood and Affect: Mood normal.         Speech: Speech normal.         Behavior: Behavior normal.         Judgment: Judgment normal.       Assessment & Plan   Diagnoses and all orders for this visit:    1. Lumbar facet arthropathy (Primary)    2. History of spinal fusion    3. Spinal stenosis of lumbar region with neurogenic claudication    4. DDD (degenerative disc disease), lumbar      --- He has had one diagnostically positive lumbar MBB and one diagnostically negative lumbar MBB. Discussed that we could proceed with a third MBB and if that one is diagnostically positive then we could move forward with a RFA.  Patient is agreeable with this. Discussed that our office will contact them once we are able to schedule.   --- Repeat Bilateral L4-S1 MBB x 1  --- Consider a  Left SI joint injection in the future.   --- Follow-up after procedure.      MARY REPORT    As the clinician, I personally reviewed the MARY from 5/17/2022 while the patient was in the office today.    Dictated utilizing Dragon dictation.      This document is intended for medical expert use only. Reading of this document by patients and/or patient's family without participating medical staff guidance may result in misinterpretation and unintended morbidity.   Any interpretation of such data is the responsibility of the patient and/or family member responsible for the patient in concert with their primary or specialist providers, not to be left for sources of online searches such as [employer], [employer] or similar queries. Relying on these approaches to  knowledge may result in misinterpretation, misguided goals of care and even death should patients or family members try recommendations outside of the realm of professional medical care in a supervised way.

## 2022-05-31 ENCOUNTER — TELEPHONE (OUTPATIENT)
Dept: NEUROSURGERY | Facility: CLINIC | Age: 82
End: 2022-05-31

## 2022-06-30 ENCOUNTER — TRANSCRIBE ORDERS (OUTPATIENT)
Dept: SURGERY | Facility: SURGERY CENTER | Age: 82
End: 2022-06-30

## 2022-06-30 DIAGNOSIS — Z41.9 SURGERY, ELECTIVE: Primary | ICD-10-CM

## 2022-07-15 NOTE — DISCHARGE INSTRUCTIONS
[facility] - Post-procedure Instructions          --  While there are no absolute restrictions, it is recommended that you do not perform strenuous activity today. In the morning, you may resume your level of activity as before your block.    --  If you have a band-aid at your injection site, please remove it later today. Observe the area for any redness, swelling, pus-like drainage, or a temperature over 101°. If any of these symptoms occur, please call your doctor at [phone]. If after office hours, leave a message and the on-call provider will return your call.    --  Ice may be applied to your injection site. It is recommended you avoid direct heat (heating pad; hot tub) for 1-2 days.    --  Call [facility] at [phone] if you experience persistent headache, persistent bleeding from the injection site, or severe pain not relieved by heat or oral medication.    --  Do not make important decisions today.    --  Due to the effects of the block and/or the I.V. Sedation, DO NOT drive or operate hazardous machinery for 12 hours.  Local anesthetics may cause numbness after procedure and precautions must be taken with regards to operating equipment as well as with walking, even if ambulating with assistance of another person or with an assistive device.    --  Do not drink alcohol for 12 hours.    -- You may return to work tomorrow, or as directed by your referring doctor.    --  Occasionally you may notice a slight increase in your pain after the procedure. This should start to improve within the next 24-48 hours. Radiofrequency ablation procedure pain may last 3-4 weeks.    --  It may take as long as 3-4 days before you notice a gradual improvement in your pain and/or other symptoms.    -- You may continue to take your prescribed pain medication as needed.    --  Some normal possible side effects of steroid use could include fluid retention, increased blood sugar, dull headache,  increased sweating, increased appetite, mood swings and flushing.    --  Diabetics are recommended to watch their blood glucose level closely for 24-48 hours after the injection.    --  Must stay in PACU for 20 min upon arrival and prove no leg weakness before being discharged.    --  IN THE EVENT OF A LIFE THREATENING EMERGENCY, (CHEST PAIN, BREATHING DIFFICULTIES, PARALYSIS…) YOU SHOULD GO TO YOUR NEAREST EMERGENCY ROOM.    --  You should be contacted by our office within 2-3 days to schedule follow up or next appointment date.  If not contacted within 7 days, please call the office at [phone]    If you have even 1 hour of relief, these injections are considered successful.

## 2022-07-15 NOTE — H&P
[facility]   HISTORY AND PHYSICAL    Patient Name: Frandy Perkins  : 1940  MRN: [id number]  Primary Care Physician:  Provider, No Known  Date of admission: (Not on file)    Subjective   Subjective     Chief Complaint: Low back pain    Continued low back pain described as aching that radiates to the hips bilaterally.      Review of Systems   Constitutional: Negative for chills and fever.   Respiratory: Negative for cough and shortness of breath.    Musculoskeletal: Positive for back pain.        Personal History     Past Medical History:   Diagnosis Date   • Arthritis    • Hypertension        Past Surgical History:   Procedure Laterality Date   • BACK SURGERY      hx of 3 back surgeries   • MEDIAL BRANCH BLOCK Bilateral 2022    Procedure: Lumbar MEDIAL BRANCH BLOCK at approximately L3-L5 Verify painful levels under fluoro;  Surgeon: Lashae Cruz MD;  [place] [facility] [place];  Service: Pain Management;  Laterality: Bilateral;   • MEDIAL BRANCH BLOCK Bilateral 2022    Procedure: LUMBAR MEDIAL BRANCH BLOCK Bilateral L4-S1;  Surgeon: Lashae Cruz MD;  [place] [facility] [place];  Service: Pain Management;  Laterality: Bilateral;   • ORTHOPEDIC SURGERY         Family History: family history is not on file. Otherwise pertinent FHx was reviewed and not pertinent to current issue.    Social History:  reports that he has quit smoking. He has quit using smokeless tobacco. He reports current alcohol use. He reports that he does not use drugs.    Home Medications:  Docusate Sodium, acetaminophen, gabapentin, hydroCHLOROthiazide, lisinopril, meloxicam, sildenafil, simvastatin, verapamil SR, and vitamin B-12    Allergies:  No Known Allergies    Objective    Objective     Vitals:   BP: ()/()   Arterial Line BP: ()/()     Physical Exam  Constitutional:       General: He is not in acute distress.  Pulmonary:      Effort: Pulmonary effort is normal. No respiratory distress.   Neurological:      Mental  Status: He is alert.   Psychiatric:         Mood and Affect: Mood normal.         Thought Content: Thought content normal.         Result Review    Result Review:  I have personally reviewed the results from the time of this admission to 7/15/2022 13:09 EDT and agree with these findings:  []  Laboratory list / accordion  []  Microbiology  []  Radiology  []  EKG/Telemetry   []  Cardiology/Vascular   []  Pathology  []  Old records  []  Other:  Most notable findings include: No new      Assessment & Plan   Assessment / Plan     Brief Patient Summary:  Frandy Perkins is a 81 y.o. male who continues to have low back pain    Active Hospital Problems:  Active Hospital Problems    Diagnosis    • **Lumbar facet arthropathy      Plan: Bilateral L4-S1 Medial branch blocks #3 (relief from 1st set, but not from 2nd).      DVT prophylaxis:  No DVT prophylaxis order currently exists.    CODE STATUS:         Lashae Cruz MD

## 2022-07-20 ENCOUNTER — HOSPITAL ENCOUNTER (OUTPATIENT)
Dept: GENERAL RADIOLOGY | Facility: SURGERY CENTER | Age: 82
Setting detail: HOSPITAL OUTPATIENT SURGERY
End: 2022-07-20

## 2022-07-20 ENCOUNTER — HOSPITAL ENCOUNTER (OUTPATIENT)
Facility: SURGERY CENTER | Age: 82
Setting detail: HOSPITAL OUTPATIENT SURGERY
Discharge: HOME | End: 2022-07-20
Attending: ANESTHESIOLOGY | Admitting: ANESTHESIOLOGY

## 2022-07-20 VITALS
SYSTOLIC BLOOD PRESSURE: 159 MMHG | RESPIRATION RATE: 16 BRPM | TEMPERATURE: 98.1 F | OXYGEN SATURATION: 98 % | DIASTOLIC BLOOD PRESSURE: 81 MMHG | HEART RATE: 56 BPM

## 2022-07-20 DIAGNOSIS — M47.816 LUMBAR FACET ARTHROPATHY: ICD-10-CM

## 2022-07-20 DIAGNOSIS — Z41.9 SURGERY, ELECTIVE: ICD-10-CM

## 2022-07-20 PROCEDURE — 77002 NEEDLE LOCALIZATION BY XRAY: CPT

## 2022-07-20 PROCEDURE — 64493 INJ PARAVERT F JNT L/S 1 LEV: CPT | Performed by: ANESTHESIOLOGY

## 2022-07-20 PROCEDURE — 76000 FLUOROSCOPY <1 HR PHYS/QHP: CPT

## 2022-07-20 PROCEDURE — 64494 INJ PARAVERT F JNT L/S 2 LEV: CPT | Performed by: ANESTHESIOLOGY

## 2022-07-20 RX ORDER — SODIUM CHLORIDE 0.9 % (FLUSH) 0.9 %
10 SYRINGE (ML) INJECTION EVERY 12 HOURS SCHEDULED
Status: DISCONTINUED | OUTPATIENT
Start: 2022-07-20 | End: 2022-07-20 | Stop reason: HOSPADM

## 2022-07-20 RX ORDER — SODIUM CHLORIDE 0.9 % (FLUSH) 0.9 %
10 SYRINGE (ML) INJECTION AS NEEDED
Status: DISCONTINUED | OUTPATIENT
Start: 2022-07-20 | End: 2022-07-20 | Stop reason: HOSPADM

## 2022-07-20 NOTE — OP NOTE
Bilateral L4-S1 Lumbar Medial Branch Blockade  [facility]      PREOPERATIVE DIAGNOSIS:  Lumbar spondylosis without myelopathy    POSTOPERATIVE DIAGNOSIS:  Lumbar spondylosis without myelopathy    PROCEDURE:   Diagnostic Lumbar Medial Branch Nerve Blockades, with fluoroscopy:  at the L4, L5 transverse processes and the sacral alar groove)   1. 26785-48 -- Lumbar Facet blocks, 1st Level  2. 65118-11 -- Lumbar Facet blocks, 2nd  Level     PRE-PROCEDURE DISCUSSION WITH PATIENT:    Risks and complications were discussed with the patient prior to starting the procedure and informed consent was obtained.      SURGEON:  Lashae Cruz MD    REASON FOR PROCEDURE:    The patient complains of pain that seems to have a significant axial component and Previous diagnostic positivity of a Lumbar Medial Branch Blockade at the same levels    SEDATION:  Patient declined administration of moderate sedation    ANESTHETIC:  0.75% bupivacaine  STEROID:  None  TOTAL VOLUME OF SOLUTION:  6ml    DESCRIPTON OF PROCEDURE:  After obtaining informed consent, IV access was not obtained in the preoperative area.   The patient was taken to the operating room.  The patient was placed in the prone position with a pillow under the abdomen. All pressure points were well padded.  EKG, blood pressure, and pulse oximeter were monitored.  The patient was monitored and sedated by the RN under my direction. The lumbosacral area was prepped with Chloraprep and draped in a sterile fashion.     AP fluoroscopic image was used to visualize the junction of the right S1 superior articular process with the sacral ala.  The skin and subcutaneous tissue over the area was anesthetized with 1% lidocaine.  A 22-gauge spinal needle was then advanced percutaneously through the anesthetized skin tract under fluoroscopic guidance in a coaxial view to contact periosteum.  After negative aspiration, a volume of 1 mL of the local anesthetic was injected  without resistance.  The image was then optimized to maximize visualization of the junctions of the L4, L5 superior articular processes with the transverse processes.  The skin and subcutaneous tissue over the areas was anesthetized with 1% lidocaine.  A 22-gauge spinal needle was then advanced percutaneously through the anesthetized skin tracts under fluoroscopic guidance in a coaxial view to contact periosteum at the target sites.  After negative aspiration, a volume of 1 mL of the local anesthetic  was injected without resistance at each of the target sites.      The same procedure was then performed on the contralateral side in the exact same fashion.        Onset of analgesia was noted.  Vital signs remained stable throughout.      ESTIMATED BLOOD LOSS:  <5 mL  SPECIMENS:  none    COMPLICATIONS:   No complications were noted.    TOLERANCE & DISCHARGE CONDITION:    The patient tolerated the procedure well.  The patient was transported to the recovery area without difficulties.  The patient was discharged to home under the care of family in stable and satisfactory condition.    Pre-procedure pain score: 5/10 at rest, 8/10 with activity  Post-procedure pain score: 2/10    PLAN OF CARE:  1. The patient was given our standard instruction sheet.  2. We discussed that Lumbar Medial Branch Blockade is a diagnostic procedure in consideration for radiofrequency ablation if two diagnostic procedures prove to be positive for significant benefit.  An alternative plan could be therapeutic lumbar branch blockades.  3. The patient is asked to keep an account of pain relief after the procedure today.  4. The patient will  Return to clinic 1-2 wks.  5. The patient will resume all medications as per the medication reconciliation sheet.

## 2022-08-08 ENCOUNTER — PREP FOR SURGERY (OUTPATIENT)
Dept: SURGERY | Facility: SURGERY CENTER | Age: 82
End: 2022-08-08

## 2022-08-08 ENCOUNTER — OFFICE VISIT (OUTPATIENT)
Dept: PAIN MEDICINE | Facility: CLINIC | Age: 82
End: 2022-08-08

## 2022-08-08 VITALS
SYSTOLIC BLOOD PRESSURE: 123 MMHG | WEIGHT: 216 LBS | OXYGEN SATURATION: 98 % | BODY MASS INDEX: 30.24 KG/M2 | DIASTOLIC BLOOD PRESSURE: 78 MMHG | HEART RATE: 60 BPM | TEMPERATURE: 97.3 F | RESPIRATION RATE: 16 BRPM | HEIGHT: 71 IN

## 2022-08-08 DIAGNOSIS — M47.817 LUMBOSACRAL SPONDYLOSIS WITHOUT MYELOPATHY: Primary | ICD-10-CM

## 2022-08-08 PROCEDURE — 99214 OFFICE O/P EST MOD 30 MIN: CPT | Performed by: PHYSICIAN ASSISTANT

## 2022-08-08 RX ORDER — SODIUM CHLORIDE 0.9 % (FLUSH) 0.9 %
10 SYRINGE (ML) INJECTION AS NEEDED
Status: CANCELLED | OUTPATIENT
Start: 2022-08-08

## 2022-08-08 RX ORDER — SODIUM CHLORIDE 0.9 % (FLUSH) 0.9 %
10 SYRINGE (ML) INJECTION EVERY 12 HOURS SCHEDULED
Status: CANCELLED | OUTPATIENT
Start: 2022-08-08

## 2022-08-08 NOTE — PROGRESS NOTES
CHIEF COMPLAINT  Procedure follow up, back pain      Subjective   Frandy Perkins is a 81 y.o. male  who presents to the office for follow-up of procedure.  He completed a confirmatory bilateral L4-S1 Lumbar Medial Branch Blockade   on  7/20/2022 performed by Dr. Cruz for management of axial low back pain. Patient reports 80% relief from the procedure x3 hours with improvement of activity/ROM.  Patient continues with report of pain in a transverse distribution across the lumbosacral spine referred into the bilateral buttocks without lower extremity radicular symptoms.  He occasionally will experience left-sided sciatica pain.    Today's pain is 7/10 VAS in severity.  Primary pain complaint is the axial low back pain.  The patient is not utilizing any type of opiate analgesics for management of his chronic pain and is ready to proceed with injective therapy based on his favorable response.    Patient's undergone previous lumbar epidural steroid injections with minimal pain relief.  He has a previous history of lumbar surgery x3 (2012, 2016, and 2017 follow-up with Dr. Damon.  He was previously in pain management at [facility] pain [facility].    Back Pain  This is a chronic problem. The problem occurs constantly. The problem has been waxing and waning since onset. The pain is present in the lumbar spine. The quality of the pain is described as aching. The pain does not radiate. The pain is at a severity of 7/10. The pain is moderate. The pain is the same all the time. The symptoms are aggravated by bending, position and twisting. Stiffness is present all day. Pertinent negatives include no chest pain, fever or numbness.     Procedures:  7/20/2022 bilateral L4-S1 MBB 80% pain relief x3 hours  5/13/2022 bilateral L4-S1 little relief  4/27/2022 bilateral L4-S1 80% pain relief x4 hours       PEG Assessment   What number best describes your pain on average in the past week?7  What number best describes how, during the  "past week, pain has interfered with your enjoyment of life?7  What number best describes how, during the past week, pain has interfered with your general activity?  7    Review of Pertinent Medical Data ---  Lumbar MRI 1/24/2022:    IMPRESSION:  Postoperative and multilevel lumbar degenerative disease  including an element of spinal canal stenosis at multiple levels. Please  see full discussion above.  Radiation dose reduction techniques were utilized, including automated  exposure control and exposure modulation based on body size.     This report was finalized on 1/25/2022 5:10 PM by Dr. Kvng Hines M.D.    The following portions of the patient's history were reviewed and updated as appropriate: allergies, current medications, past family history, past medical history, past social history, past surgical history and problem list.    Review of Systems   Constitutional: Negative for fever.   HENT: Negative for congestion.    Eyes: Negative for visual disturbance.   Respiratory: Negative for shortness of breath.    Cardiovascular: Negative for chest pain.   Gastrointestinal: Negative for constipation and diarrhea.   Musculoskeletal: Positive for back pain and joint swelling.   Neurological: Negative for numbness.   Psychiatric/Behavioral: Negative for sleep disturbance and suicidal ideas.     I have reviewed and confirmed the accuracy of the ROS as documented by the MA/LPN/RN RANDY Adamson    Vitals:    08/08/22 1034   BP: 123/78   Pulse: 60   Resp: 16   Temp: 97.3 °F (36.3 °C)   SpO2: 98%   Weight: 98 kg (216 lb)   Height: 180.3 cm (70.98\")   PainSc:   7   PainLoc: Back         Objective   Physical Exam  Vitals reviewed.   Constitutional:       Appearance: Normal appearance.   HENT:      Head: Normocephalic.   Pulmonary:      Effort: Pulmonary effort is normal.   Musculoskeletal:      Lumbar back: Spasms and tenderness present. Decreased range of motion.      Comments: Decreased ROM in all " planes--increased pain with lumbar extension/lateral rotation    Tenderness to palpation over the bilateral lumbar facet joints   Skin:     General: Skin is warm and dry.   Neurological:      General: No focal deficit present.      Mental Status: He is alert and oriented to person, place, and time.      Cranial Nerves: Cranial nerves are intact.      Sensory: Sensation is intact.      Motor: Motor function is intact.      Gait: Gait abnormal.   Psychiatric:         Mood and Affect: Mood normal.         Behavior: Behavior normal.         Thought Content: Thought content normal.         Judgment: Judgment normal.             Assessment & Plan   Diagnoses and all orders for this visit:    1. Lumbosacral spondylosis without myelopathy (Primary)    Plan for patient to return for RFA left L4-S1  Plan for patient to return for RFA right L4-S1    --- Follow-up 6 weeks after injective therapy            Dictated utilizing Dragon dictation.      This document is intended for medical expert use only. Reading of this document by patients and/or patient's family without participating medical staff guidance may result in misinterpretation and unintended morbidity.   Any interpretation of such data is the responsibility of the patient and/or family member responsible for the patient in concert with their primary or specialist providers, not to be left for sources of online searches such as [employer], [employer] or similar queries. Relying on these approaches to knowledge may result in misinterpretation, misguided goals of care and even death should patients or family members try recommendations outside of the realm of professional medical care in a supervised way.    Patient remained masked during entire encounter. No cough present. I donned a mask and eye protection throughout entire visit. Prior to donning mask and eye protection, hand hygiene was performed, as well as when it was doffed.  I was closer than 6 feet, but not for an  extended period of time. No obvious exposure to any bodily fluids.

## 2022-08-09 ENCOUNTER — TRANSCRIBE ORDERS (OUTPATIENT)
Dept: SURGERY | Facility: SURGERY CENTER | Age: 82
End: 2022-08-09

## 2022-08-09 DIAGNOSIS — Z41.9 SURGERY, ELECTIVE: Primary | ICD-10-CM

## 2022-08-09 PROBLEM — M47.817 LUMBOSACRAL SPONDYLOSIS WITHOUT MYELOPATHY: Status: ACTIVE | Noted: 2022-08-09

## 2022-08-19 ENCOUNTER — TRANSCRIBE ORDERS (OUTPATIENT)
Dept: SURGERY | Facility: SURGERY CENTER | Age: 82
End: 2022-08-19

## 2022-08-19 DIAGNOSIS — M47.817 LUMBOSACRAL SPONDYLOSIS WITHOUT MYELOPATHY: Primary | ICD-10-CM

## 2022-09-14 NOTE — DISCHARGE INSTRUCTIONS
[facility] - Post-procedure Instructions          --  While there are no absolute restrictions, it is recommended that you do not perform strenuous activity today. In the morning, you may resume your level of activity as before your block.    --  If you have a band-aid at your injection site, please remove it later today. Observe the area for any redness, swelling, pus-like drainage, or a temperature over 101°. If any of these symptoms occur, please call your doctor at [phone]. If after office hours, leave a message and the on-call provider will return your call.    --  Ice may be applied to your injection site. It is recommended you avoid direct heat (heating pad; hot tub) for 1-2 days.    --  Call [facility] at [phone] if you experience persistent headache, persistent bleeding from the injection site, or severe pain not relieved by heat or oral medication.    --  Do not make important decisions today.    --  Due to the effects of the block and/or the I.V. Sedation, DO NOT drive or operate hazardous machinery for 12 hours.  Local anesthetics may cause numbness after procedure and precautions must be taken with regards to operating equipment as well as with walking, even if ambulating with assistance of another person or with an assistive device.    --  Do not drink alcohol for 12 hours.    -- You may return to work tomorrow, or as directed by your referring doctor.    --  Occasionally you may notice a slight increase in your pain after the procedure. This should start to improve within the next 24-48 hours. Radiofrequency ablation procedure pain may last 3-4 weeks.    --  It may take as long as 3-4 days before you notice a gradual improvement in your pain and/or other symptoms.    -- You may continue to take your prescribed pain medication as needed.    --  Some normal possible side effects of steroid use could include fluid retention, increased blood sugar, dull headache,  "increased sweating, increased appetite, mood swings and flushing.    --  Diabetics are recommended to watch their blood glucose level closely for 24-48 hours after the injection.    --  Must stay in PACU for 20 min upon arrival and prove no leg weakness before being discharged.    --  IN THE EVENT OF A LIFE THREATENING EMERGENCY, (CHEST PAIN, BREATHING DIFFICULTIES, PARALYSIS…) YOU SHOULD GO TO YOUR NEAREST EMERGENCY ROOM.    --  You should be contacted by our office within 2-3 days to schedule follow up or next appointment date.  If not contacted within 7 days, please call the office at [phone]    Radiofrequency ablation (RFA):     - Radiofrequency ablation is a term used to describe cauterization or \"burning.\"   - In pain management, we can use this technique with a special needle to target and destroy areas that are causing your pain.   - In most cases, you must have TWO successful \"test injections\" before you are a candidate for RFA.    After your RFA:   - Because heat is used in this technique, it is common to have soreness after the procedure.  Sometimes \"neuritis\" occurs, which feels like tingling, prickly, or sunburn under the skin sensations.   - Ice packs are helpful in decreasing this soreness and preventing post-procedure \"neuritis\" pain.  Use an ice pack for 20 minutes at a time at least 3 times the day of and the day after your procedure.   - It is common to have arm/leg numbness or weakness the day of your procedure, but this should wear off by the following day.   - It may take up to 6 weeks to gain full benefit from this procedure.    "

## 2022-09-16 ENCOUNTER — HOSPITAL ENCOUNTER (OUTPATIENT)
Facility: SURGERY CENTER | Age: 82
Setting detail: HOSPITAL OUTPATIENT SURGERY
Discharge: HOME | End: 2022-09-16
Attending: ANESTHESIOLOGY | Admitting: ANESTHESIOLOGY

## 2022-09-16 ENCOUNTER — HOSPITAL ENCOUNTER (OUTPATIENT)
Dept: GENERAL RADIOLOGY | Facility: SURGERY CENTER | Age: 82
Setting detail: HOSPITAL OUTPATIENT SURGERY
End: 2022-09-16

## 2022-09-16 VITALS
HEIGHT: 71 IN | RESPIRATION RATE: 20 BRPM | OXYGEN SATURATION: 96 % | TEMPERATURE: 97.6 F | BODY MASS INDEX: 30.1 KG/M2 | HEART RATE: 51 BPM | WEIGHT: 215 LBS | SYSTOLIC BLOOD PRESSURE: 128 MMHG | DIASTOLIC BLOOD PRESSURE: 93 MMHG

## 2022-09-16 DIAGNOSIS — M47.817 LUMBOSACRAL SPONDYLOSIS WITHOUT MYELOPATHY: ICD-10-CM

## 2022-09-16 PROCEDURE — 64636 DESTROY L/S FACET JNT ADDL: CPT | Performed by: ANESTHESIOLOGY

## 2022-09-16 PROCEDURE — 25010000002 MIDAZOLAM PER 1 MG: Performed by: ANESTHESIOLOGY

## 2022-09-16 PROCEDURE — 64635 DESTROY LUMB/SAC FACET JNT: CPT | Performed by: ANESTHESIOLOGY

## 2022-09-16 PROCEDURE — 76000 FLUOROSCOPY <1 HR PHYS/QHP: CPT

## 2022-09-16 PROCEDURE — 25010000002 FENTANYL CITRATE (PF) 50 MCG/ML SOLUTION: Performed by: ANESTHESIOLOGY

## 2022-09-16 RX ORDER — SODIUM CHLORIDE 0.9 % (FLUSH) 0.9 %
10 SYRINGE (ML) INJECTION AS NEEDED
Status: DISCONTINUED | OUTPATIENT
Start: 2022-09-16 | End: 2022-09-16 | Stop reason: HOSPADM

## 2022-09-16 RX ORDER — SODIUM CHLORIDE 0.9 % (FLUSH) 0.9 %
10 SYRINGE (ML) INJECTION EVERY 12 HOURS SCHEDULED
Status: DISCONTINUED | OUTPATIENT
Start: 2022-09-16 | End: 2022-09-16 | Stop reason: HOSPADM

## 2022-09-16 RX ORDER — FENTANYL CITRATE 50 UG/ML
INJECTION, SOLUTION INTRAMUSCULAR; INTRAVENOUS AS NEEDED
Status: DISCONTINUED | OUTPATIENT
Start: 2022-09-16 | End: 2022-09-16 | Stop reason: HOSPADM

## 2022-09-16 RX ORDER — MIDAZOLAM HYDROCHLORIDE 1 MG/ML
INJECTION INTRAMUSCULAR; INTRAVENOUS AS NEEDED
Status: DISCONTINUED | OUTPATIENT
Start: 2022-09-16 | End: 2022-09-16 | Stop reason: HOSPADM

## 2022-09-16 NOTE — H&P
[facility]   HISTORY AND PHYSICAL    Patient Name: Frandy Perkins  : 1940  MRN: [id number]  Primary Care Physician:  Jessica Forrest MD  Date of admission: 2022    Subjective   Subjective     Chief Complaint: Chronic low back pain    History of Present Illness  Chronic low back pain without radicular component       Review of Systems   Constitutional: Negative for chills and fever.   Respiratory: Negative for cough and shortness of breath.    Musculoskeletal: Positive for back pain.        Personal History     Past Medical History:   Diagnosis Date   • Arthritis    • Hypertension        Past Surgical History:   Procedure Laterality Date   • BACK SURGERY      hx of 3 back surgeries   • MEDIAL BRANCH BLOCK Bilateral 2022    Procedure: Lumbar MEDIAL BRANCH BLOCK at approximately L3-L5 Verify painful levels under fluoro;  Surgeon: Lashae Cruz MD;  [place] [facility] [facility];  Service: Pain Management;  Laterality: Bilateral;   • MEDIAL BRANCH BLOCK Bilateral 2022    Procedure: LUMBAR MEDIAL BRANCH BLOCK Bilateral L4-S1;  Surgeon: Lashae Cruz MD;  Location: [facility];  Service: Pain Management;  Laterality: Bilateral;   • MEDIAL BRANCH BLOCK Bilateral 2022    Procedure: Bilateral L4-S1 MEDIAL BRANCH BLOCK;  Surgeon: Lashae Cruz MD;  Location: [facility];  Service: Pain Management;  Laterality: Bilateral;   • ORTHOPEDIC SURGERY         Family History: family history is not on file. Otherwise pertinent FHx was reviewed and not pertinent to current issue.    Social History:  reports that he has quit smoking. He has quit using smokeless tobacco. He reports current alcohol use. He reports that he does not use drugs.    Home Medications:  Docusate Sodium, acetaminophen, gabapentin, hydroCHLOROthiazide, lisinopril, meloxicam, sildenafil, simvastatin, verapamil SR, and vitamin B-12    Allergies:  No Known Allergies    Objective    Objective     Vitals:        Physical  Exam  Constitutional:       General: He is not in acute distress.  Pulmonary:      Effort: Pulmonary effort is normal. No respiratory distress.   Neurological:      Mental Status: He is alert.   Psychiatric:         Mood and Affect: Mood normal.         Thought Content: Thought content normal.         Result Review    Result Review:  I have personally reviewed the results from the time of this admission to 9/16/2022 09:55 EDT and agree with these findings:  []  Laboratory list / accordion  []  Microbiology  []  Radiology  []  EKG/Telemetry   []  Cardiology/Vascular   []  Pathology  []  Old records  []  Other:  Most notable findings include: No new      Assessment & Plan   Assessment / Plan     Brief Patient Summary:  Frandy Perkins is a 81 y.o. male who has chronic low back pain    Active Hospital Problems:  Active Hospital Problems    Diagnosis    • **Lumbosacral spondylosis without myelopathy      Added automatically from request for surgery [id number]       Plan: Lumbar Radiofrequency ablation (thermal, non-pulsed)       DVT prophylaxis:  No DVT prophylaxis order currently exists.    CODE STATUS:       Lashae Cruz MD

## 2022-09-16 NOTE — OP NOTE
Radiofrequency ablation of left L4-S1  [facility]    PREOPERATIVE DIAGNOSIS:  Lumbar spondylosis without myelopathy   POSTOPERATIVE DIAGNOSIS:  Lumbar spondylosis without myelopathy     PROCEDURES PERFORMED:   Left  lumbar radiofrequency ablation of the medial branches at the transverse processes of L4, L5, and the sacral alar groove to thermally treat these facet joints.  1.  92678 -LT Lumbar radiofrequency ablation 1st level.  2.  39594 -LT Lumbar radiofrequency ablation 2nd level.     INFORMED CONSENT:  In preprocedure discussion with the patient, the risks and complications were discussed prior to starting the procedure and informed consent was obtained.     SURGEON:   Lashae Cruz MD    INDICATIONS:  The patient presents with chronic lower back pain. The patient underwent 2 lumbar medial branch blocks with diagnostically positive relief. Given the patient’s significant pain relief, it is diagnostic that we have likely found the source of the patient’s pain; therefore, lumbar radiofrequency ablation has been indicated.     SEDATION:  Versed 1mg & Fentanyl 50 mcg IV.    TIME OF PROCEDURE:  The Interoperative procedure time, after administration of the IV sedative, was 12 minutes.    ANESTHETIC:  Lidocaine 1% for skin infiltration, 2% lidocaine and 0.75% bupivacaine for injection.    STEROID:  None.    DESCRIPTION OF PROCEDURE:  After obtaining informed consent an IV was  started in the preoperative area. The patient was taken to the operating room. The patient was placed in prone position with a pillow under the abdomen. All pressure points were padded. EKG, blood pressure, and pulse oximetry were monitored. The patient was  sedated. The lumbosacral area was prepped with ChloraPrep and draped in a sterile fashion.     The junction of the left S1 superior articular process and sacral ala was identified in a AP fluoroscopic view. The skin and subcutaneous tissue inferior to the junction was  anesthetized with 1% lidocaine. A 20-gauge 150mm RF Abbott needle was then advanced percutaneously through the anesthetized skin tract under fluoroscopic guidance until the non-insulated portion of the needle lie at the junction.  The image was then obliqued towards the left side to maximize visualization of the junctions of the L4, L5 superior articular processes with the transverse processes.  Needle placement was performed as described above until the non-insulated portion of the needles lie at the targeted junctions.  All needle tips were confirmed to be posterior to the neural foramen in the lateral fluoroscopic view. Sensory stimulation was then performed with good stimulation of the back at 0.5V or less at each level. Motor stimulation was performed up to 1.5V at each level producing stimulation of the multifidus muscles of the back and no stimulation of the lower extremity at any level. Each level was then anesthetized with 2% lidocaine prior to treatment with pulsed radiofrequency thermocoagulation at 42 degrees Celsius. Each level was then treated with thermal radiofrequency thermocoagulation at 80 degrees Celsius for 60 seconds in two separate cycles.  Prior to the removal of each needle, a volume of 1 mL of injectate was administered at each site.  The total volume consisted of 1mL of 2% lidocaine and 1mL of 0.75% bupivacaine.    ESTIMATED BLOOD LOSS:  Minimal.    SPECIMENS:  None.    COMPLICATIONS:  None.    TOLERANCE AND DISCHARGE:  The patient tolerated the procedure well. The patient was transported to the recovery area without difficulties. The patient was discharged home under the care of family in stable and satisfactory condition.    PLAN:  1.  The patient was given our standard instruction sheet.  2.  The patient will resume all medications per the medication reconciliation sheet.  3.  The patient will Repeat injection 2 wks contralateral side.

## 2022-09-30 ENCOUNTER — HOSPITAL ENCOUNTER (OUTPATIENT)
Dept: GENERAL RADIOLOGY | Facility: SURGERY CENTER | Age: 82
Setting detail: HOSPITAL OUTPATIENT SURGERY
End: 2022-09-30

## 2022-09-30 ENCOUNTER — HOSPITAL ENCOUNTER (OUTPATIENT)
Facility: SURGERY CENTER | Age: 82
Setting detail: HOSPITAL OUTPATIENT SURGERY
Discharge: HOME | End: 2022-09-30
Attending: ANESTHESIOLOGY | Admitting: ANESTHESIOLOGY

## 2022-09-30 VITALS
HEART RATE: 60 BPM | SYSTOLIC BLOOD PRESSURE: 127 MMHG | DIASTOLIC BLOOD PRESSURE: 67 MMHG | WEIGHT: 215 LBS | HEIGHT: 70 IN | TEMPERATURE: 97 F | BODY MASS INDEX: 30.78 KG/M2 | OXYGEN SATURATION: 94 % | RESPIRATION RATE: 16 BRPM

## 2022-09-30 DIAGNOSIS — Z41.9 SURGERY, ELECTIVE: ICD-10-CM

## 2022-09-30 PROCEDURE — 25010000002 FENTANYL CITRATE (PF) 50 MCG/ML SOLUTION: Performed by: ANESTHESIOLOGY

## 2022-09-30 PROCEDURE — 64636 DESTROY L/S FACET JNT ADDL: CPT | Performed by: ANESTHESIOLOGY

## 2022-09-30 PROCEDURE — 76000 FLUOROSCOPY <1 HR PHYS/QHP: CPT

## 2022-09-30 PROCEDURE — 25010000002 MIDAZOLAM PER 1 MG: Performed by: ANESTHESIOLOGY

## 2022-09-30 PROCEDURE — 64635 DESTROY LUMB/SAC FACET JNT: CPT | Performed by: ANESTHESIOLOGY

## 2022-09-30 RX ORDER — MIDAZOLAM HYDROCHLORIDE 1 MG/ML
INJECTION INTRAMUSCULAR; INTRAVENOUS AS NEEDED
Status: DISCONTINUED | OUTPATIENT
Start: 2022-09-30 | End: 2022-09-30 | Stop reason: HOSPADM

## 2022-09-30 RX ORDER — SODIUM CHLORIDE 0.9 % (FLUSH) 0.9 %
10 SYRINGE (ML) INJECTION EVERY 12 HOURS SCHEDULED
Status: DISCONTINUED | OUTPATIENT
Start: 2022-09-30 | End: 2022-09-30 | Stop reason: HOSPADM

## 2022-09-30 RX ORDER — FENTANYL CITRATE 50 UG/ML
INJECTION, SOLUTION INTRAMUSCULAR; INTRAVENOUS AS NEEDED
Status: DISCONTINUED | OUTPATIENT
Start: 2022-09-30 | End: 2022-09-30 | Stop reason: HOSPADM

## 2022-09-30 RX ORDER — SODIUM CHLORIDE 0.9 % (FLUSH) 0.9 %
10 SYRINGE (ML) INJECTION AS NEEDED
Status: DISCONTINUED | OUTPATIENT
Start: 2022-09-30 | End: 2022-09-30 | Stop reason: HOSPADM

## 2022-11-22 ENCOUNTER — OFFICE VISIT (OUTPATIENT)
Dept: PAIN MEDICINE | Facility: CLINIC | Age: 82
End: 2022-11-22

## 2022-11-22 ENCOUNTER — PREP FOR SURGERY (OUTPATIENT)
Dept: SURGERY | Facility: SURGERY CENTER | Age: 82
End: 2022-11-22

## 2022-11-22 VITALS
DIASTOLIC BLOOD PRESSURE: 72 MMHG | OXYGEN SATURATION: 99 % | HEART RATE: 81 BPM | SYSTOLIC BLOOD PRESSURE: 123 MMHG | WEIGHT: 214.6 LBS | BODY MASS INDEX: 30.72 KG/M2 | RESPIRATION RATE: 18 BRPM | TEMPERATURE: 97.3 F | HEIGHT: 70 IN

## 2022-11-22 DIAGNOSIS — M47.817 LUMBOSACRAL SPONDYLOSIS WITHOUT MYELOPATHY: Primary | ICD-10-CM

## 2022-11-22 DIAGNOSIS — M48.062 SPINAL STENOSIS OF LUMBAR REGION WITH NEUROGENIC CLAUDICATION: Primary | ICD-10-CM

## 2022-11-22 DIAGNOSIS — Z98.1 HISTORY OF SPINAL FUSION: ICD-10-CM

## 2022-11-22 DIAGNOSIS — M48.062 SPINAL STENOSIS OF LUMBAR REGION WITH NEUROGENIC CLAUDICATION: ICD-10-CM

## 2022-11-22 DIAGNOSIS — M47.816 LUMBAR FACET ARTHROPATHY: ICD-10-CM

## 2022-11-22 PROCEDURE — 99214 OFFICE O/P EST MOD 30 MIN: CPT | Performed by: NURSE PRACTITIONER

## 2022-11-22 RX ORDER — SODIUM CHLORIDE 0.9 % (FLUSH) 0.9 %
10 SYRINGE (ML) INJECTION EVERY 12 HOURS SCHEDULED
Status: CANCELLED | OUTPATIENT
Start: 2022-11-22

## 2022-11-22 RX ORDER — SODIUM CHLORIDE 0.9 % (FLUSH) 0.9 %
10 SYRINGE (ML) INJECTION AS NEEDED
Status: CANCELLED | OUTPATIENT
Start: 2022-11-22

## 2022-11-22 NOTE — PROGRESS NOTES
CHIEF COMPLAINT  PROCEDURE FOLLOW UP   Patient in office today reports RFA was not successful his pain level has continued to remain consistent.    Subjective   Frandy Perkins is a 82 y.o. male  who presents to the office for follow-up of procedure.  He completed a Left L4-S1 RFA on 9/16/2022 and Right L4-S1 RFA   on  9/30/2022 performed by Dr. Cruz for management of back pain. Patient reports No relief from the procedure.     Today his pain is 7/10VAS in severity. He continues to complain of coccygeal and low back pain that radiates into his bilateral hips.   His pain is worsened by standing, walking, bending, and twisting. His pain is relieved with sitting. He is maintained on Gabapentin (managed by his PCP) and meloxicam.     Previously completed epidurals with minimal relief many years ago.  He has a history of lumbar surgery x3 (2012, 2016, 2017 with Dr. Damon).  He is previously a patient [facility] pain management.    Procedure list:  7/20/2022-bilateral L4-S1 MBB-80% relief x3 hours with improvement of activity/ROM  5/13/2022-bilateral L4-S1 MBB-minimal relief  4/27/2022-bilateral L4-S1 MBB-80% relief x ~4 hours    Past pain medications: Norco (only after surgeries)     Current pain medications: Gabapentin, Meloxicam, and Tylenol     Past therapies:  Physical Therapy: Yes, somewhat helpful previously. He is currently in PT now as well.   Chiropractor: Yes  Massage Therapy: No  TENS: Yes at therapy, temporarily helpful  Neck or back surgery: Lumbar surgery x 3 (2012, 2016, 2017 with Dr. Damon)  Past pain management: [facility] pain clinic     Previous Injections: Epidurals  Effect of Injection (%): minimal relief, if any    Back Pain  This is a chronic problem. The current episode started more than 1 year ago. The problem occurs constantly. The problem has been gradually worsening since onset. The pain is present in the lumbar spine and sacro-iliac. The quality of the pain is described as aching. Radiates to:  bilateral hips. The pain is at a severity of 6/10. The pain is worse during the day. The symptoms are aggravated by bending, standing and twisting (walking). Stiffness is present in the morning. Associated symptoms include abdominal pain, headaches, numbness (fingers) and weakness (legs). Pertinent negatives include no chest pain, dysuria or fever. He has tried NSAIDs, heat and chiropractic manipulation (Tylenol) for the symptoms.      PEG Assessment   What number best describes your pain on average in the past week?9  What number best describes how, during the past week, pain has interfered with your enjoyment of life?9  What number best describes how, during the past week, pain has interfered with your general activity?  9    The following portions of the patient's history were reviewed and updated as appropriate: allergies, current medications, past family history, past medical history, past social history, past surgical history and problem list.    Review of Systems   Constitutional: Positive for fatigue. Negative for activity change and fever.   HENT: Negative for congestion.    Eyes: Negative for visual disturbance.   Respiratory: Negative for cough and chest tightness.    Cardiovascular: Negative for chest pain.   Gastrointestinal: Positive for abdominal pain. Negative for constipation and diarrhea.   Genitourinary: Negative for difficulty urinating and dysuria.   Musculoskeletal: Positive for back pain.   Neurological: Positive for weakness (legs), numbness (fingers) and headaches. Negative for dizziness and light-headedness.   Psychiatric/Behavioral: Positive for agitation. Negative for sleep disturbance and suicidal ideas. The patient is not nervous/anxious.      --  The aforementioned information the Chief Complaint section and above subjective data including any HPI data, and also the Review of Systems data, has been personally reviewed and affirmed.  --     Vitals:    11/22/22 0918   BP: 123/72   BP  "Location: Left arm   Patient Position: Sitting   Cuff Size: Large Adult   Pulse: 81   Resp: 18   Temp: 97.3 °F (36.3 °C)   TempSrc: Temporal   SpO2: 99%   Weight: 97.3 kg (214 lb 9.6 oz)   Height: 177.8 cm (70\")   PainSc:   7   PainLoc: Comment: BACK     Objective   Physical Exam  Vitals and nursing note reviewed.   Constitutional:       Appearance: Normal appearance. He is well-developed.   Eyes:      General: Lids are normal.   Cardiovascular:      Rate and Rhythm: Normal rate.   Pulmonary:      Effort: Pulmonary effort is normal.   Musculoskeletal:      Lumbar back: Tenderness present. Decreased range of motion.   Neurological:      Mental Status: He is alert and oriented to person, place, and time.      Gait: Gait abnormal (cane).   Psychiatric:         Attention and Perception: Attention normal.         Mood and Affect: Mood normal.         Speech: Speech normal.         Behavior: Behavior normal.         Judgment: Judgment normal.       Assessment & Plan   Diagnoses and all orders for this visit:    1. Lumbosacral spondylosis without myelopathy (Primary)    2. Lumbar facet arthropathy    3. History of spinal fusion    4. Spinal stenosis of lumbar region with neurogenic claudication      --- He is not interested in any opioids for the treatment of his pain.   --- He has not completed epidurals for many years. It is reasonable to trial a caudal epidural for his back and hip pain.   Reviewed the procedure at length with the patient.  Included in the review was expectations, complications, risk and benefits.The procedure was described in detail and the risks, benefits and alternatives were discussed with the patient (including but not limited to: bleeding, infection, nerve damage, worsening of pain, inability to perform injection, paralysis, seizures, coma, no pain relief and death) who agreed to proceed.  Discussed the potential for sedation if warranted/wanted.  The procedure will plan to be performed at [facility], [place], operated by [facility] with fluoroscopic guidance(unless ultrasound is indicated) and could potentially have steroids and contrast dye used. Questions were answered and in a way the patient could understand.  Patient verbalized understanding and wishes to proceed.  This intervention will be ordered.  Discussed with patient that all procedures are part of a multimodal plan of care and include either formal PT or a home exercise program.  Patient has no evidence of coagulopathy or current infection.    --- If no relief with Caudal ABRAN it is reasonable to consider SCS.     --- Follow-up after procedure     Dictated utilizing Dragon dictation.      Patient remained masked during entire encounter. No cough present. I donned a mask and eye protection throughout entire visit. Prior to donning mask and eye protection, hand hygiene was performed, as well as when it was doffed.  I was closer than 6 feet, but not for an extended period of time. No obvious exposure to any bodily fluids.

## 2022-11-28 ENCOUNTER — TRANSCRIBE ORDERS (OUTPATIENT)
Dept: SURGERY | Facility: SURGERY CENTER | Age: 82
End: 2022-11-28

## 2022-11-28 DIAGNOSIS — Z41.9 SURGERY, ELECTIVE: Primary | ICD-10-CM

## 2022-12-15 NOTE — DISCHARGE INSTRUCTIONS
[facility] [place] [facility] - Post-procedure Instructions          --  While there are no absolute restrictions, it is recommended that you do not perform strenuous activity today. In the morning, you may resume your level of activity as before your block.    --  If you have a band-aid at your injection site, please remove it later today. Observe the area for any redness, swelling, pus-like drainage, or a temperature over 101°. If any of these symptoms occur, please call your doctor at [phone]. If after office hours, leave a message and the on-call provider will return your call.    --  Ice may be applied to your injection site. It is recommended you avoid direct heat (heating pad; hot tub) for 1-2 days.    --  Call [facility] [place] at [phone] if you experience persistent headache, persistent bleeding from the injection site, or severe pain not relieved by heat or oral medication.    --  Do not make important decisions today.    --  Due to the effects of the block and/or the I.V. Sedation, DO NOT drive or operate hazardous machinery for 12 hours.  Local anesthetics may cause numbness after procedure and precautions must be taken with regards to operating equipment as well as with walking, even if ambulating with assistance of another person or with an assistive device.    --  Do not drink alcohol for 12 hours.    -- You may return to work tomorrow, or as directed by your referring doctor.    --  Occasionally you may notice a slight increase in your pain after the procedure. This should start to improve within the next 24-48 hours. Radiofrequency ablation procedure pain may last 3-4 weeks.    --  It may take as long as 3-4 days before you notice a gradual improvement in your pain and/or other symptoms.    -- You may continue to take your prescribed pain medication as needed.    --  Some normal possible side effects of steroid use could include fluid retention, increased blood sugar, dull headache,  increased sweating, increased appetite, mood swings and flushing.    --  Diabetics are recommended to watch their blood glucose level closely for 24-48 hours after the injection.    --  Must stay in PACU for 20 min upon arrival and prove no leg weakness before being discharged.    --  IN THE EVENT OF A LIFE THREATENING EMERGENCY, (CHEST PAIN, BREATHING DIFFICULTIES, PARALYSIS…) YOU SHOULD GO TO YOUR NEAREST EMERGENCY ROOM.    --  You should be contacted by our office within 2-3 days to schedule follow up or next appointment date.  If not contacted within 7 days, please call the office at [phone]

## 2022-12-15 NOTE — SIGNIFICANT NOTE
Patient educated on the following :    - If you are receiving Sedation for your procedure Nothing to Eat 6 hours and only clear liquids for 2 hours prior to your procedure.    -You will need to have someone drive you home after your PROCEDURE and remain with you for 24 hours after the PROCEDURE  - The date of your procedure, your are welcome to have one visitor at bedside or remain within 10-15 minutes of [facility]  -You will need to arrive at 0915 on 12/19/22 PROCEDURE  -Please contact [facility] at: [phone] with any questions and/or concerns

## 2022-12-19 ENCOUNTER — HOSPITAL ENCOUNTER (OUTPATIENT)
Facility: SURGERY CENTER | Age: 82
Setting detail: HOSPITAL OUTPATIENT SURGERY
Discharge: HOME | End: 2022-12-19
Attending: ANESTHESIOLOGY | Admitting: ANESTHESIOLOGY

## 2022-12-19 ENCOUNTER — HOSPITAL ENCOUNTER (OUTPATIENT)
Dept: GENERAL RADIOLOGY | Facility: SURGERY CENTER | Age: 82
Setting detail: HOSPITAL OUTPATIENT SURGERY
End: 2022-12-19

## 2022-12-19 VITALS
WEIGHT: 214 LBS | SYSTOLIC BLOOD PRESSURE: 152 MMHG | DIASTOLIC BLOOD PRESSURE: 87 MMHG | TEMPERATURE: 97.4 F | OXYGEN SATURATION: 97 % | HEART RATE: 69 BPM | BODY MASS INDEX: 30.64 KG/M2 | RESPIRATION RATE: 18 BRPM | HEIGHT: 70 IN

## 2022-12-19 DIAGNOSIS — M48.062 SPINAL STENOSIS OF LUMBAR REGION WITH NEUROGENIC CLAUDICATION: ICD-10-CM

## 2022-12-19 DIAGNOSIS — Z41.9 SURGERY, ELECTIVE: ICD-10-CM

## 2022-12-19 PROCEDURE — 25010000002 DEXAMETHASONE SODIUM PHOSPHATE 100 MG/10ML SOLUTION 10 ML VIAL: Performed by: ANESTHESIOLOGY

## 2022-12-19 PROCEDURE — 77002 NEEDLE LOCALIZATION BY XRAY: CPT

## 2022-12-19 PROCEDURE — 62323 NJX INTERLAMINAR LMBR/SAC: CPT | Performed by: ANESTHESIOLOGY

## 2022-12-19 PROCEDURE — 25010000002 IOPAMIDOL 61 % SOLUTION 30 ML VIAL: Performed by: ANESTHESIOLOGY

## 2022-12-19 PROCEDURE — 76000 FLUOROSCOPY <1 HR PHYS/QHP: CPT

## 2022-12-19 RX ORDER — SODIUM CHLORIDE 0.9 % (FLUSH) 0.9 %
10 SYRINGE (ML) INJECTION EVERY 12 HOURS SCHEDULED
Status: DISCONTINUED | OUTPATIENT
Start: 2022-12-19 | End: 2022-12-19 | Stop reason: HOSPADM

## 2022-12-19 RX ORDER — SODIUM CHLORIDE 0.9 % (FLUSH) 0.9 %
10 SYRINGE (ML) INJECTION AS NEEDED
Status: DISCONTINUED | OUTPATIENT
Start: 2022-12-19 | End: 2022-12-19 | Stop reason: HOSPADM

## 2022-12-19 NOTE — OP NOTE
Caudal Epidural:  [facility]    PREOPERATIVE DIAGNOSIS: Lumbar Degenerative Disc Disease and Lumbar Spinal Stenosis unspecified regarding Neurogenic Claudication    POSTOPERATIVE DIAGNOSIS: Same as preop diagnosis    PROCEDURE:    • Caudal Epidural Steroid Injection, Therapeutic, with fluoroscopic guidance    PRE-PROCEDURE DISCUSSION WITH PATIENT:    Risks and complications were discussed with the patient prior to starting the procedure and informed consent was obtained.  We discussed various topics including but not limited to bleeding & infection & injury & paralysis & coma & death & worsening of clinical picture & postprocedural soreness & lack of pain relief.    SURGEON:  Lashae Cruz MD    REASON FOR PROCEDURE: Diagnostic injection at this level is needed, Degenerative changes are noted in the area. and Stenotic area is noted, and is likely contributing to this chronic &/or recurrent pain.     SEDATION:   Patient declined administration of moderate sedation    ANESTHETIC:  Marcaine 0.5%  STEROID:     15mg dexamethasone  TOTAL VOLUME OF INJECTATE: 6 mL    DESCRIPTON OF PROCEDURE:  After obtaining informed consent, the patient taken to the operating room and was placed in the prone position.  An IV  was not  started in the preoperative area.  All pressure points were well padded.  EKG, blood pressure, and pulse oximetry were monitored.  All sedation that was administered was given by the RN under my direct supervision and guidance.      The lumbosacral area was prepped with Chloraprep and draped in a sterile fashion with a sterile drape.  Lateral fluoroscopy was used to identify the sacral hiatus.  The skin and subcutaneous tissue overlying the area was anesthetized with 1% Lidocaine. A 22 gauge spinal needle was then advanced percutaneously through the anesthestized skin tract under fluoroscopic guidance into the caudal epidural space.  After negative aspiration for blood or CSF, a volume  of 1mL of Isovue was injected under live fluoroscopy. This revealed good epidural spread, with no evidence of loculation, vascular run-off, or intrathecal spread.  Subsequently, a volume of 6mL consisting of 15mg of dexamethasone  mixed with 0.5mL of 0.5% bupivacaine and normal saline was injected without resistance.  The needle was removed intact.       ESTIMATED BLOOD LOSS:  minimal  SPECIMENS:  none    COMPLICATIONS:   No complications were noted., There was no indication of vascular uptake on live injection of contrast dye. and There was no indication of intrathecal uptake on live injection of contrast dye.    TOLERANCE & DISCHARGE CONDITION:    The patient tolerated the procedure well.  The patient was transported to the recovery area without difficulties.  The patient was discharged to home under the care of family in stable and satisfactory condition.    PLAN OF CARE:  1. The patient was given our standard instruction sheet.  2. The patient will Return to clinic 4-6 wks  3. The patient will resume all medications as per the medication reconciliation sheet.

## 2023-01-16 ENCOUNTER — PREP FOR SURGERY (OUTPATIENT)
Dept: SURGERY | Facility: SURGERY CENTER | Age: 83
End: 2023-01-16
Payer: MEDICARE

## 2023-01-16 ENCOUNTER — OFFICE VISIT (OUTPATIENT)
Dept: PAIN MEDICINE | Facility: CLINIC | Age: 83
End: 2023-01-16
Payer: MEDICARE

## 2023-01-16 VITALS
TEMPERATURE: 96.6 F | HEIGHT: 70 IN | BODY MASS INDEX: 31.07 KG/M2 | WEIGHT: 217 LBS | HEART RATE: 77 BPM | OXYGEN SATURATION: 96 % | DIASTOLIC BLOOD PRESSURE: 81 MMHG | RESPIRATION RATE: 18 BRPM | SYSTOLIC BLOOD PRESSURE: 141 MMHG

## 2023-01-16 DIAGNOSIS — M51.36 DDD (DEGENERATIVE DISC DISEASE), LUMBAR: ICD-10-CM

## 2023-01-16 DIAGNOSIS — M47.817 LUMBOSACRAL SPONDYLOSIS WITHOUT MYELOPATHY: Primary | ICD-10-CM

## 2023-01-16 DIAGNOSIS — M48.062 SPINAL STENOSIS OF LUMBAR REGION WITH NEUROGENIC CLAUDICATION: Primary | ICD-10-CM

## 2023-01-16 DIAGNOSIS — M47.816 LUMBAR FACET ARTHROPATHY: ICD-10-CM

## 2023-01-16 DIAGNOSIS — M48.062 SPINAL STENOSIS OF LUMBAR REGION WITH NEUROGENIC CLAUDICATION: ICD-10-CM

## 2023-01-16 DIAGNOSIS — Z98.1 HISTORY OF SPINAL FUSION: ICD-10-CM

## 2023-01-16 PROCEDURE — 99214 OFFICE O/P EST MOD 30 MIN: CPT | Performed by: NURSE PRACTITIONER

## 2023-01-16 RX ORDER — SODIUM CHLORIDE 0.9 % (FLUSH) 0.9 %
10 SYRINGE (ML) INJECTION AS NEEDED
Status: CANCELLED | OUTPATIENT
Start: 2023-01-16

## 2023-01-16 RX ORDER — SODIUM CHLORIDE 0.9 % (FLUSH) 0.9 %
10 SYRINGE (ML) INJECTION EVERY 12 HOURS SCHEDULED
Status: CANCELLED | OUTPATIENT
Start: 2023-01-16

## 2023-01-16 NOTE — PROGRESS NOTES
CHIEF COMPLAINT  PROCEDURE FOLLOW UP CAUDAL EPIDURAL 12/19/2022  Patient reports caudal epidural was not effective his pain level has continued to stay consistent.     Subjective   Frandy Perkins is a 82 y.o. male  who presents to the office for follow-up of procedure.  He completed a Caudal ABRAN   on  12/19/2022 performed by Dr. Cruz for management of back pain. Patient reports No relief from the procedure.     Today his pain is 6/10VAS in severity. His pain remains in his low back/sacral area just left of midline. His pain does radiate into his hips and is worsened by bending, twisting, starting, and walking. His pain is improved with rest. He denies any change in lower extremity strength. He denies any urinary incontinence. He has failed lumbar RFA as well as a caudal ABRAN.  Will plan to [place] L1 TFESI to target his most stenosed area. If this is not successful then I recommend SCS for his pain.     Previously completed epidurals with minimal relief many years ago.  He has a history of lumbar surgery x3 (2012, 2016, 2017 with Dr. Damon).  He is previously a patient [facility] pain management.    Evaluated by Dr. Nair on 3/2/2022.  Patient has adjacent level stenosis at L1-2 which is severe.  Goal is to avoid surgery at L1-2 if possible.  If he develops severe inability to walk and weakness in the legs or urinary symptoms then consider L1-L2 surgery.  Patient was referred to PT during this appointment.    Procedure list:  9/30/2022-right L4-S1 RFA-no relief  9/16/2022-right L4-S1 RFA-no relief  7/20/2022-bilateral L4-S1 MBB-80% relief x3 hours with improvement of activity/ROM  5/13/2022-bilateral L4-S1 MBB-minimal relief  4/27/2022-bilateral L4-S1 MBB-80% relief x ~4 hours    Back Pain  This is a chronic problem. The current episode started more than 1 year ago. The problem occurs constantly. The problem has been gradually worsening since onset. The pain is present in the lumbar spine and sacro-iliac. The  quality of the pain is described as aching. Radiates to: bilateral hips. The pain is at a severity of 6/10. The pain is worse during the day. The symptoms are aggravated by bending, standing and twisting (walking). Stiffness is present in the morning. Associated symptoms include headaches and weakness. Pertinent negatives include no abdominal pain, chest pain, dysuria, fever or numbness. He has tried NSAIDs, heat and chiropractic manipulation (Tylenol) for the symptoms.      PEG Assessment   What number best describes your pain on average in the past week?7  What number best describes how, during the past week, pain has interfered with your enjoyment of life?4  What number best describes how, during the past week, pain has interfered with your general activity?  6    The following portions of the patient's history were reviewed and updated as appropriate: allergies, current medications, past family history, past medical history, past social history, past surgical history and problem list.    Review of Systems   Constitutional: Negative for activity change, fatigue and fever.   HENT: Negative for congestion.    Eyes: Negative for visual disturbance.   Respiratory: Positive for cough. Negative for chest tightness.    Cardiovascular: Negative for chest pain.   Gastrointestinal: Negative for abdominal pain, constipation and diarrhea.   Genitourinary: Negative for difficulty urinating and dysuria.   Musculoskeletal: Positive for back pain.   Neurological: Positive for weakness and headaches. Negative for dizziness, light-headedness and numbness.   Psychiatric/Behavioral: Positive for sleep disturbance (occasionally). Negative for agitation and suicidal ideas. The patient is nervous/anxious.      --  The aforementioned information the Chief Complaint section and above subjective data including any HPI data, and also the Review of Systems data, has been personally reviewed and affirmed.  --     Vitals:    01/16/23 0852  "  BP: 141/81   BP Location: Right arm   Patient Position: Sitting   Cuff Size: Large Adult   Pulse: 77   Resp: 18   Temp: 96.6 °F (35.9 °C)   TempSrc: Temporal   SpO2: 96%   Weight: 98.4 kg (217 lb)   Height: 177.8 cm (70\")   PainSc:   6   PainLoc: Comment: BACK         Objective   Physical Exam  Vitals and nursing note reviewed.   Constitutional:       Appearance: Normal appearance. He is well-developed.   Eyes:      General: Lids are normal.   Cardiovascular:      Rate and Rhythm: Normal rate.   Pulmonary:      Effort: Pulmonary effort is normal.   Musculoskeletal:      Lumbar back: Tenderness and bony tenderness present. Decreased range of motion. Negative right straight leg raise test and negative left straight leg raise test.   Neurological:      Mental Status: He is alert and oriented to person, place, and time.      Gait: Gait abnormal (cane).   Psychiatric:         Attention and Perception: Attention normal.         Mood and Affect: Mood normal.         Speech: Speech normal.         Behavior: Behavior normal.         Judgment: Judgment normal.       Assessment & Plan   Diagnoses and all orders for this visit:    1. Lumbosacral spondylosis without myelopathy (Primary)    2. Lumbar facet arthropathy    3. History of spinal fusion    4. Spinal stenosis of lumbar region with neurogenic claudication    5. DDD (degenerative disc disease), lumbar      --- Bilateral L1 TFESI  Reviewed the procedure at length with the patient.  Included in the review was expectations, complications, risk and benefits.The procedure was described in detail and the risks, benefits and alternatives were discussed with the patient (including but not limited to: bleeding, infection, nerve damage, worsening of pain, inability to perform injection, paralysis, seizures, coma, no pain relief and death) who agreed to proceed.  Discussed the potential for sedation if warranted/wanted.  The procedure will plan to be performed at [facility], operated by [facility] with fluoroscopic guidance(unless ultrasound is indicated) and could potentially have steroids and contrast dye used. Questions were answered and in a way the patient could understand.  Patient verbalized understanding and wishes to proceed.  This intervention will be ordered.  Discussed with patient that all procedures are part of a multimodal plan of care and include either formal PT or a home exercise program.  Patient has no evidence of coagulopathy or current infection.    --- Discussed SCS process including FDA required psychological evaluation, education session, phase 1 trialing, and phase 2 implant. I encouraged Mr. Perkins and his wife to think about considering this treatment and also to discuss with Dr. Nair.   --- Follow-up after procedure     Dictated utilizing Dragon dictation.      Patient remained masked during entire encounter. No cough present. I donned a mask and eye protection throughout entire visit. Prior to donning mask and eye protection, hand hygiene was performed, as well as when it was doffed.  I was closer than 6 feet, but not for an extended period of time. No obvious exposure to any bodily fluids.

## 2023-01-17 ENCOUNTER — TRANSCRIBE ORDERS (OUTPATIENT)
Dept: SURGERY | Facility: SURGERY CENTER | Age: 83
End: 2023-01-17
Payer: MEDICARE

## 2023-01-17 DIAGNOSIS — Z41.9 SURGERY, ELECTIVE: Primary | ICD-10-CM

## 2023-01-30 ENCOUNTER — HOSPITAL ENCOUNTER (OUTPATIENT)
Facility: SURGERY CENTER | Age: 83
Setting detail: HOSPITAL OUTPATIENT SURGERY
Discharge: HOME | End: 2023-01-30
Attending: ANESTHESIOLOGY | Admitting: ANESTHESIOLOGY
Payer: MEDICARE

## 2023-01-30 ENCOUNTER — HOSPITAL ENCOUNTER (OUTPATIENT)
Dept: GENERAL RADIOLOGY | Facility: SURGERY CENTER | Age: 83
Setting detail: HOSPITAL OUTPATIENT SURGERY
End: 2023-01-30
Payer: MEDICARE

## 2023-01-30 VITALS
HEIGHT: 70 IN | DIASTOLIC BLOOD PRESSURE: 88 MMHG | RESPIRATION RATE: 16 BRPM | BODY MASS INDEX: 30.06 KG/M2 | WEIGHT: 210 LBS | OXYGEN SATURATION: 96 % | TEMPERATURE: 98.4 F | HEART RATE: 67 BPM | SYSTOLIC BLOOD PRESSURE: 116 MMHG

## 2023-01-30 DIAGNOSIS — M48.062 SPINAL STENOSIS OF LUMBAR REGION WITH NEUROGENIC CLAUDICATION: ICD-10-CM

## 2023-01-30 DIAGNOSIS — Z41.9 SURGERY, ELECTIVE: ICD-10-CM

## 2023-01-30 PROCEDURE — 76000 FLUOROSCOPY <1 HR PHYS/QHP: CPT

## 2023-01-30 PROCEDURE — 77002 NEEDLE LOCALIZATION BY XRAY: CPT

## 2023-01-30 PROCEDURE — 64483 NJX AA&/STRD TFRM EPI L/S 1: CPT | Performed by: ANESTHESIOLOGY

## 2023-01-30 PROCEDURE — 25010000002 IOPAMIDOL 61 % SOLUTION 30 ML VIAL: Performed by: ANESTHESIOLOGY

## 2023-01-30 PROCEDURE — 25010000002 DEXAMETHASONE SODIUM PHOSPHATE 100 MG/10ML SOLUTION 10 ML VIAL: Performed by: ANESTHESIOLOGY

## 2023-01-30 RX ORDER — SODIUM CHLORIDE 0.9 % (FLUSH) 0.9 %
10 SYRINGE (ML) INJECTION AS NEEDED
Status: DISCONTINUED | OUTPATIENT
Start: 2023-01-30 | End: 2023-01-30 | Stop reason: HOSPADM

## 2023-01-30 RX ORDER — SODIUM CHLORIDE 0.9 % (FLUSH) 0.9 %
10 SYRINGE (ML) INJECTION EVERY 12 HOURS SCHEDULED
Status: DISCONTINUED | OUTPATIENT
Start: 2023-01-30 | End: 2023-01-30 | Stop reason: HOSPADM

## 2023-02-13 ENCOUNTER — OFFICE VISIT (OUTPATIENT)
Dept: NEUROSURGERY | Facility: CLINIC | Age: 83
End: 2023-02-13
Payer: MEDICARE

## 2023-02-13 VITALS
HEIGHT: 70 IN | BODY MASS INDEX: 30.9 KG/M2 | WEIGHT: 215.8 LBS | SYSTOLIC BLOOD PRESSURE: 124 MMHG | DIASTOLIC BLOOD PRESSURE: 68 MMHG

## 2023-02-13 DIAGNOSIS — Z98.1 HISTORY OF SPINAL FUSION: ICD-10-CM

## 2023-02-13 DIAGNOSIS — M48.062 SPINAL STENOSIS OF LUMBAR REGION WITH NEUROGENIC CLAUDICATION: ICD-10-CM

## 2023-02-13 DIAGNOSIS — M51.36 DDD (DEGENERATIVE DISC DISEASE), LUMBAR: Primary | ICD-10-CM

## 2023-02-13 DIAGNOSIS — M47.816 LUMBAR FACET JOINT SYNDROME: ICD-10-CM

## 2023-02-13 PROCEDURE — 99214 OFFICE O/P EST MOD 30 MIN: CPT | Performed by: NEUROLOGICAL SURGERY

## 2023-02-20 ENCOUNTER — TELEPHONE (OUTPATIENT)
Dept: NEUROSURGERY | Facility: CLINIC | Age: 83
End: 2023-02-20
Payer: MEDICARE

## 2023-02-27 ENCOUNTER — OFFICE VISIT (OUTPATIENT)
Dept: PAIN MEDICINE | Facility: CLINIC | Age: 83
End: 2023-02-27
Payer: MEDICARE

## 2023-02-27 VITALS
TEMPERATURE: 98.6 F | HEIGHT: 70 IN | RESPIRATION RATE: 18 BRPM | HEART RATE: 65 BPM | SYSTOLIC BLOOD PRESSURE: 124 MMHG | WEIGHT: 220.6 LBS | OXYGEN SATURATION: 94 % | DIASTOLIC BLOOD PRESSURE: 74 MMHG | BODY MASS INDEX: 31.58 KG/M2

## 2023-02-27 DIAGNOSIS — M48.062 SPINAL STENOSIS OF LUMBAR REGION WITH NEUROGENIC CLAUDICATION: ICD-10-CM

## 2023-02-27 DIAGNOSIS — M51.36 DDD (DEGENERATIVE DISC DISEASE), LUMBAR: ICD-10-CM

## 2023-02-27 DIAGNOSIS — M47.817 LUMBOSACRAL SPONDYLOSIS WITHOUT MYELOPATHY: ICD-10-CM

## 2023-02-27 DIAGNOSIS — Z98.1 HISTORY OF SPINAL FUSION: Primary | ICD-10-CM

## 2023-02-27 PROCEDURE — 99213 OFFICE O/P EST LOW 20 MIN: CPT | Performed by: PHYSICIAN ASSISTANT

## 2023-02-27 NOTE — PROGRESS NOTES
CHIEF COMPLAINT  Follow-up for back pain.      Subjective   Frandy Perkins is a 82 y.o. male  who presents to the office for follow-up of procedure.  He completed a Lumbar Transforaminal Epidural Steroid Injection Bilateral L1-L2 Levels   on  1/30/2023 performed by Dr. Cruz for management of back pain. Patient reports 40% relief from the procedure for a few hours.  Patient continues with report of pain that is localized within the low back/sacral area left of midline.  He does also have pain that radiates into the bilateral hips and finds that his pain is aggravated by bending, twisting, standing and walking.  Pain is improved only with rest.  He denies lower extremity radiculopathy nor any decrease in strength.    This patient has failed to respond to conservative measures as well as failure to respond to interventional pain procedures consisting of caudal ABRAN, lumbar RFA and now bilateral L1 TFESI.    Recent neurosurgical evaluation with Dr. Nair on 2/13/2023 was to proceed with utilization of a back brace in hopes that this will provide more stabilization and support of the lumbar spine.  The patient will follow-up with Dr. Nair in 6 months with hopes to continue to defer any other operative intervention.    Pain today 6/10 VAS in severity.    Back Pain  This is a chronic problem. The current episode started more than 1 year ago. The problem occurs constantly. The problem is unchanged. The pain is present in the lumbar spine and sacro-iliac. The quality of the pain is described as aching and burning. The pain does not radiate. The pain is at a severity of 6/10. The pain is moderate. The pain is the same all the time. The symptoms are aggravated by standing, twisting and bending (walking). Stiffness is present in the morning. Associated symptoms include numbness (bilateral feet) and weakness (bilateral arms and legs). Pertinent negatives include no chest pain.        PEG Assessment   What number  best describes your pain on average in the past week?7  What number best describes how, during the past week, pain has interfered with your enjoyment of life?6  What number best describes how, during the past week, pain has interfered with your general activity?  7    Review of Pertinent Medical Data ---  POSTCONTRAST CT SCAN OF THE LUMBAR SPINE INCLUDING RECONSTRUCTION IMAGES  01/24/2022     HISTORY: Low back pain. Bilateral leg pain. Previous lumbar fusion.     Following the lumbar spine myelogram, postmyelogram CT scan was obtained  from the lower thoracic spine to the sacrum. Sagittal and coronal  reconstruction images were reviewed.     At T10-11 there is a small left paracentral disc osteophyte complex. No  mass effect on the spinal cord is seen. There is mild ligamentum flavum  hypertrophy and mild canal stenosis at T10-11.     The T11-T12 intervertebral disc appears within normal limits with no  canal stenosis.     At T12-L1 there is a minimal disc bulge most prominent in the left  paracentral position. There is facet joint arthropathy, ligamentum  flavum hypertrophy and mild canal stenosis at T12-L1. No significant  cord flattening is seen.     At L1-2 there is severe vacuum disc phenomenon with degenerative  endplate changes and disc space narrowing. Mild-to-moderate disc  osteophyte complex is seen. There is bilateral foraminal narrowing.  There is bilateral facet joint arthropathy and moderately severe canal  stenosis at L1-2, best seen on axial image 54 through 58.     Bilateral pedicle screws are seen fusing L2 and L3.     At L2-3 there is vacuum disc phenomenon with moderate disc osteophyte  complex, most pronounced in the left paracentral position as seen on  axial image 66. Laminectomy defect appears to be present. There is  bilateral foraminal narrowing and mild-to-moderate canal stenosis at  L2-3.     At L3-4 there is essentially complete loss of the intervertebral disc  space with minimal  anterolisthesis of L3 on L4 and mild to moderate disc  osteophyte complex. There is right foraminal narrowing. There is facet  joint arthropathy and mild canal stenosis at L3-4, best seen on axial  image 76.     At L4-5 there is a vacuum disc phenomenon with approximately 6 mm  retrolisthesis of L4 on L5 and some uncovering of the intervertebral  disc as well as a broad-based disc bulge. There is bilateral foraminal  narrowing, facet joint arthropathy and minimal canal stenosis at L4-5.     Vacuum disc phenomenon is seen at L5-S1 with mild disc osteophyte  complex. There is bilateral facet joint arthropathy and ligamentum  flavum hypertrophy with mild canal stenosis at L5-S1.     The lumbar spine myelogram images show the L2 and L3 pedicle screws in  good position. Mild canal stenosis is seen at L1-2 and moderate canal  stenosis is seen at L2-3. There also was some delayed passage of  contrast material into the lower thecal sac related to canal stenosis at  L3-4. The lower lumbar thecal sac is not optimally opacified.     IMPRESSION:  Postoperative and multilevel lumbar degenerative disease  including an element of spinal canal stenosis at multiple levels. Please  see full discussion above.           Radiation dose reduction techniques were utilized, including automated  exposure control and exposure modulation based on body size.     This report was finalized on 1/25/2022 5:10 PM by Dr. Kvng Hines M.D.    The following portions of the patient's history were reviewed and updated as appropriate: allergies, current medications, past family history, past medical history, past social history, past surgical history and problem list.    Review of Systems   Respiratory: Positive for shortness of breath.    Cardiovascular: Negative for chest pain.   Gastrointestinal: Negative for constipation and diarrhea.   Genitourinary: Negative for difficulty urinating.   Musculoskeletal: Positive for back pain.   Neurological:  "Positive for weakness (bilateral arms and legs) and numbness (bilateral feet).   Psychiatric/Behavioral: Negative for sleep disturbance and suicidal ideas. The patient is not nervous/anxious.      I have reviewed and confirmed the accuracy of the ROS as documented by the MA/LPN/RN RANDY Adamson     Vitals:    02/27/23 1027   BP: 124/74   Pulse: 65   Resp: 18   Temp: 98.6 °F (37 °C)   SpO2: 94%   Weight: 100 kg (220 lb 9.6 oz)   Height: 177.8 cm (70\")   PainSc:   6   PainLoc: Back         Objective   Physical Exam  Vitals and nursing note reviewed. Chaperone present: wife is present.   Constitutional:       Appearance: Normal appearance.   HENT:      Head: Normocephalic.   Pulmonary:      Effort: Pulmonary effort is normal.   Musculoskeletal:      Lumbar back: Spasms and tenderness present. Decreased range of motion. Scoliosis present.   Skin:     General: Skin is warm and dry.   Neurological:      General: No focal deficit present.      Mental Status: He is alert and oriented to person, place, and time.      Cranial Nerves: Cranial nerves 2-12 are intact.      Sensory: Sensation is intact.      Motor: Motor function is intact.      Gait: Gait abnormal (antalgic gait--ambulates with a cane).   Psychiatric:         Mood and Affect: Mood normal.         Behavior: Behavior normal.         Thought Content: Thought content normal.         Judgment: Judgment normal.         Assessment & Plan   Diagnoses and all orders for this visit:    1. History of spinal fusion (Primary)    2. Spinal stenosis of lumbar region with neurogenic claudication    3. DDD (degenerative disc disease), lumbar    4. Lumbosacral spondylosis without myelopathy        --- Follow-up as needed.  On the patient's last office visit it was discussed with him consideration of neuromodulation and I have provided more education with the patient again on today's visit.  The patient and his wife state at this time they are not interested in proceeding " with this modality.              Dictated utilizing Dragon dictation.      Patient remained masked during entire encounter. No cough present. I donned a mask and eye protection throughout entire visit. Prior to donning mask and eye protection, hand hygiene was performed, as well as when it was doffed.  I was closer than 6 feet, but not for an extended period of time. No obvious exposure to any bodily fluids.

## 2023-03-13 NOTE — TELEPHONE ENCOUNTER
PATIENT CALLED TO REQUEST HELP IN OBTAINING BRACE, AS [email or web address] AND STILL DOES NOT HAVE HIS PRESCRIBED BRACE. THEY INDICATE TO HIM THAT BRACE WILL BE PROVIDED THROUGH [facility].    AT LAST CONTACT ON 3/8/23 (11:30AM), HE WAS ADVISED BY [facility] REPRESENTATIVE THAT THEY WOULD EMAIL [place] BRACING AND CALL HIM BACK TO UPDATE, BUT PATIENT STILL HAS NOT RECEIVED A CALL.    PLEASE CALL PATIENT TO ADVISE ON WHETHER OUR OFFICE IS ABLE TO ASSIST HIM IN MAKING SURE HE IS PROVIDED WITH THE APPROPRIATE BRACE.

## 2023-08-11 ENCOUNTER — TELEPHONE (OUTPATIENT)
Dept: NEUROSURGERY | Facility: CLINIC | Age: 83
End: 2023-08-11

## 2023-08-11 NOTE — TELEPHONE ENCOUNTER
Called both numbers, LVM: need to reschedule appt on 8/16 with Dr. Nair due to him being unavailable that day. Have to cancel appt but asked if he could call us back to reschedule.     [facility] can read.

## 2024-03-27 ENCOUNTER — OFFICE VISIT (OUTPATIENT)
Dept: PAIN MEDICINE | Facility: CLINIC | Age: 84
End: 2024-03-27
Payer: MEDICARE

## 2024-03-27 VITALS
OXYGEN SATURATION: 98 % | BODY MASS INDEX: 30.12 KG/M2 | WEIGHT: 210.4 LBS | SYSTOLIC BLOOD PRESSURE: 118 MMHG | HEIGHT: 70 IN | DIASTOLIC BLOOD PRESSURE: 82 MMHG | HEART RATE: 81 BPM | TEMPERATURE: 97.1 F

## 2024-03-27 DIAGNOSIS — M48.062 SPINAL STENOSIS OF LUMBAR REGION WITH NEUROGENIC CLAUDICATION: Primary | ICD-10-CM

## 2024-03-27 DIAGNOSIS — Z98.1 HISTORY OF SPINAL FUSION: ICD-10-CM

## 2024-03-27 DIAGNOSIS — G89.4 CHRONIC PAIN SYNDROME: ICD-10-CM

## 2024-03-27 DIAGNOSIS — M51.36 DDD (DEGENERATIVE DISC DISEASE), LUMBAR: ICD-10-CM

## 2024-03-27 DIAGNOSIS — M47.816 LUMBAR FACET ARTHROPATHY: ICD-10-CM

## 2024-03-27 PROCEDURE — 99214 OFFICE O/P EST MOD 30 MIN: CPT | Performed by: PHYSICIAN ASSISTANT

## 2024-03-27 PROCEDURE — 1159F MED LIST DOCD IN RCRD: CPT | Performed by: PHYSICIAN ASSISTANT

## 2024-03-27 PROCEDURE — 1160F RVW MEDS BY RX/DR IN RCRD: CPT | Performed by: PHYSICIAN ASSISTANT

## 2024-03-27 PROCEDURE — 1125F AMNT PAIN NOTED PAIN PRSNT: CPT | Performed by: PHYSICIAN ASSISTANT

## 2024-03-27 NOTE — PROGRESS NOTES
CHIEF COMPLAINT    Follow up back pain. The patient states the back pain has worsened, despite epidurals, ablation, pain has not improved, he is interested in SCS.    Subjective   Frandy Perkins is a 83 y.o. male  who presents for follow-up.  He has a history of chronic low back and leg pain.  This patient continues with persistent pain that is localized within the lumbar and sacral region left of the midline with radiating pain into the bilateral hips and occasionally into the lower extremities.  He finds increased discomfort/pain within the lower extremities secondary to spinal stenosis.  This patient has history of prior L2-3 posterior lumbar decompression fusion performed in 2016 followed by left L5-S1 decompression performed in 2017 with Dr. Damon.  Recent evaluation with the surgeon is with recommendation to avoid any further fusion within the lumbar spine.  Patient also has progressive worsening pain in the lumbar spine that does radiate into the bilateral hips however imaging of the hips does not account for this pain.    Patient has failed to respond to conservative measures as well as failure to respond to interventional pain procedures after consistent of a caudal ABRAN's, lumbar MBB/RFA and bilateral L1 TFESI.  He states that he most recently underwent repeat lumbar epidural steroid injection approximate 1 week ago with Dr. Yogi Brown with suboptimal relief.      Back Pain  This is a chronic problem. The current episode started more than 1 year ago. The problem occurs constantly. The problem is unchanged. The pain is present in the lumbar spine and sacro-iliac. The quality of the pain is described as aching and burning. The pain does not radiate. The pain is at a severity of 7/10. The pain is moderate. The pain is The same all the time. The symptoms are aggravated by standing, twisting and bending (walking). Stiffness is present In the morning. Associated symptoms include leg pain, numbness (bilateral  foot) and weakness (bilateral arm, bilateral leg, back). Pertinent negatives include no abdominal pain, chest pain, dysuria or fever.        PEG Assessment   What number best describes your pain on average in the past week?7  What number best describes how, during the past week, pain has interfered with your enjoyment of life?7  What number best describes how, during the past week, pain has interfered with your general activity?  7    Review of Pertinent Medical Data ---  Lumbar Spine Films     Examination dated 11/08/2023     CLINICAL HISTORY: back pain.     COMPARISON: 9/20/2016     FINDINGS: 7 views of the lumbar spine are provided. Posterior fusion   hardware consisting of bilateral pedicle screws and vertical rods at   L2-3. The hardware is intact. Apparent osseous fusion of the L3-4   endplates. Severe degenerative endplate changes and severe loss of   disc space height throughout the remainder of the lumbar spine,   progressed since 2016. Unchanged alignment of the vertebral bodies   with severe leftward curvature of the upper lumbar spine. There is   kyphosis of the lumbar spine, similar to prior. Unchanged alignment on   flexion and extension.     IMPRESSION:   Posterior fusion at L2-3, stable. Progression of degenerative changes   and loss of disc space height, now severe, throughout the lumbar   spine.     Dictated by: Aga Morales M.D.     Images and Report reviewed and interpreted by: Aga Morales M.D.     <PS><Electronically signed by: Aag Morales M.D.>  11/08/2023 1410     The following portions of the patient's history were reviewed and updated as appropriate: allergies, current medications, past family history, past medical history, past social history, past surgical history, and problem list.    Review of Systems   Constitutional:  Negative for chills and fever.   Respiratory:  Positive for shortness of breath (upon exertion). Negative for cough.    Cardiovascular:  Negative for chest pain.  "  Gastrointestinal:  Negative for abdominal pain, constipation and diarrhea.   Genitourinary:  Negative for dysuria. Difficulty urinating: intermittent.  Musculoskeletal:  Positive for back pain. Gait problem: ambulates with a cane, stooped over position while ambulating.  Neurological:  Positive for weakness (bilateral arm, bilateral leg, back) and numbness (bilateral foot). Negative for dizziness and light-headedness.   Psychiatric/Behavioral:  Negative for agitation.      I have reviewed and confirmed the accuracy of the ROS as documented by the MA/LPN/RN RANDY Adamson  Vitals:    03/27/24 1128   BP: 118/82   BP Location: Left arm   Patient Position: Sitting   Pulse: 81   Temp: 97.1 °F (36.2 °C)   SpO2: 98%   Weight: 95.4 kg (210 lb 6.4 oz)   Height: 177.8 cm (70\")   PainSc:   7   PainLoc: Back         Objective   Physical Exam  Vitals and nursing note reviewed. Chaperone present: wife is present.   Constitutional:       Appearance: Normal appearance.   HENT:      Head: Normocephalic.   Pulmonary:      Effort: Pulmonary effort is normal.   Musculoskeletal:      Lumbar back: Spasms and tenderness present. Decreased range of motion. Scoliosis present.   Skin:     General: Skin is warm and dry.   Neurological:      General: No focal deficit present.      Mental Status: He is alert and oriented to person, place, and time.      Cranial Nerves: Cranial nerves 2-12 are intact.      Sensory: Sensation is intact.      Motor: Motor function is intact.      Gait: Gait abnormal (antalgic gait--ambulates with a cane and is flexed at the hips).   Psychiatric:         Mood and Affect: Mood normal.         Behavior: Behavior normal.         Thought Content: Thought content normal.         Judgment: Judgment normal.         Assessment & Plan   Diagnoses and all orders for this visit:    1. Spinal stenosis of lumbar region with neurogenic claudication (Primary)  -     Ambulatory Referral to Psychology    2. History of spinal " fusion  -     Ambulatory Referral to Psychology    3. Lumbar facet arthropathy    4. DDD (degenerative disc disease), lumbar    5. Chronic pain syndrome  -     Ambulatory Referral to Psychology        Frandy Perkins reports a pain score of 7.  Given his pain assessment as noted, treatment options were discussed and the following options were decided upon as a follow-up plan to address the patient's pain: continuation of current treatment plan for pain, educational materials on pain management, and use of non-medical modalities (ice, heat, stretching and/or behavior modifications).      --- The patient states that after second surgical appointment with Dr. Jackie PONCE with no recommendation to proceed with any further surgery that I still stand by the recommendation to consider trial with neurostimulation.  --- The patient and his wife are agreeable and wish to initiate the process.  Psychological referral has been placed to Verito Fields DNP.  --- The patient would like to have the SCS trial performed at the [facility] if he is found to be in a Appropriate candidate which has been agreed to by Dr. Cruz.  The patient understands that the [facility] will only be able to offer p.o. Valium and not IV sedation.  --- Follow-up in 6 weeks for further evaluation and at the [place] satellite office          Dictated utilizing Dragon dictation.

## 2024-04-26 ENCOUNTER — TELEPHONE (OUTPATIENT)
Dept: PAIN MEDICINE | Facility: CLINIC | Age: 84
End: 2024-04-26

## 2024-04-26 NOTE — TELEPHONE ENCOUNTER
Provider: NELSON    Caller: PATIENT    Phone Number: [phone]    Reason for Call: PATIENT WOULD LIKE AN UPDATE ON THE SCS. HE STATES HE HAD HIS PSYCH EVAL LAST FRIDAY, AND HASN'T HEARD FROM ANYONE.

## 2024-04-29 ENCOUNTER — PREP FOR SURGERY (OUTPATIENT)
Dept: SURGERY | Facility: SURGERY CENTER | Age: 84
End: 2024-04-29
Payer: MEDICARE

## 2024-04-29 DIAGNOSIS — M48.062 SPINAL STENOSIS OF LUMBAR REGION WITH NEUROGENIC CLAUDICATION: ICD-10-CM

## 2024-04-29 DIAGNOSIS — Z98.1 HISTORY OF SPINAL FUSION: Primary | ICD-10-CM

## 2024-04-29 DIAGNOSIS — M54.6 THORACIC SPINE PAIN: ICD-10-CM

## 2024-04-29 RX ORDER — DIAZEPAM 5 MG/1
10 TABLET ORAL ONCE
OUTPATIENT
Start: 2024-04-29 | End: 2024-04-29

## 2024-04-29 NOTE — TELEPHONE ENCOUNTER
José Miguel--Please let patient know I have placed orders for him to have updated MRI of the thoracic and lumbar spine--needed before having the SCS trial.  He also has a f/u appt on 5/14 with [facility] in [facility]--I have already placed the SCS orders ([facility])  so ok to proceed with getting him set up for Dr Cruz--she has approved him doing the trial at [place] with po valium to be given.

## 2024-04-29 NOTE — TELEPHONE ENCOUNTER
I spoke with Mr. Perkins and he would like to get the MRI done at [facility] in [place].

## 2024-04-30 DIAGNOSIS — Z01.818 PRE-OP TESTING: Primary | ICD-10-CM

## 2024-05-13 ENCOUNTER — HOSPITAL ENCOUNTER (OUTPATIENT)
Dept: MRI IMAGING | Facility: HOSPITAL | Age: 84
Discharge: HOME | End: 2024-05-13
Payer: MEDICARE

## 2024-05-13 ENCOUNTER — APPOINTMENT (OUTPATIENT)
Dept: OTHER | Facility: HOSPITAL | Age: 84
End: 2024-05-13
Payer: MEDICARE

## 2024-05-13 DIAGNOSIS — Z98.1 HISTORY OF SPINAL FUSION: ICD-10-CM

## 2024-05-13 DIAGNOSIS — M48.062 SPINAL STENOSIS OF LUMBAR REGION WITH NEUROGENIC CLAUDICATION: ICD-10-CM

## 2024-05-13 DIAGNOSIS — M54.6 THORACIC SPINE PAIN: ICD-10-CM

## 2024-05-13 LAB — CREAT BLDA-MCNC: 1.2 MG/DL (ref 0.6–1.3)

## 2024-05-13 PROCEDURE — 82565 ASSAY OF CREATININE: CPT

## 2024-05-13 PROCEDURE — 72158 MRI LUMBAR SPINE W/O & W/DYE: CPT

## 2024-05-13 PROCEDURE — 0 GADOBENATE DIMEGLUMINE 529 MG/ML SOLUTION: Performed by: PHYSICIAN ASSISTANT

## 2024-05-13 PROCEDURE — 72146 MRI CHEST SPINE W/O DYE: CPT

## 2024-05-13 PROCEDURE — A9577 INJ MULTIHANCE: HCPCS | Performed by: PHYSICIAN ASSISTANT

## 2024-05-13 RX ADMIN — GADOBENATE DIMEGLUMINE 19 ML: 529 INJECTION, SOLUTION INTRAVENOUS at 12:47

## 2024-05-24 NOTE — DISCHARGE INSTRUCTIONS
Spinal Cord Stimulator Trial Postoperative instructions:  Keep the dressing on the incision. It will be removed at your appointment in Pain Medicine Clinic in 5-7 days.  Keep the dressing clean and dry. Do NOT shower or submerge yourself in water (i.e. bath tub, pool, etc.) until after you return to Pain Medicine Clinic and the leads are removed.  You should have received information from the representative about your stimulator. Please call the representative with any questions about the stimulator.   Please call the Pain Medicine Clinic number below for any signs of infection such as new severe back pain or leg numbness/weakness, redness or discharge from the incision sites, or fever greater than 101 F.  Call us if the dressing comes off.  Your return appointment in the Pain Medicine Clinic should be about 5-7 days from today. If you don't have a return appointment, call the Pain Medicine Clinic at [phone] to schedule a follow up appointment to remove the dressing and leads.  Do NOT take blood thinners while the stimulator is in place. Confirm with us when you will restart you blood thinners (based on the instructions given to you by the prescriber of your blood thinners.)  No heavy lifting >10 pounds. No excessive reaching, bending, or twisting.  Avoid sudden twisting.  You can take Tylenol as needed as long as you have no contraindications (liver disease, etc.).  You my increase the frequency of your current pain medications for the acute post-operative pain until you are seen in Pain Clinic in 5-7 days. Ask us how much you may increase your pain medications.  Take the prescribed antibiotics until you are seen in the Pain Medicine Clinic at follow up.  Turn off the stimulator when driving.    [facility] (Monday--Friday, 8:00AM - 4:30 PM):  [facility]. [phone]    Post Sedation/Anesthesia Instructions:  Your anesthesia was supervised/directed by a Physician Anesthesiologist.  The medication you  received may make you drowsy. Your perception, attitude, and reaction time may be affected following sedation or anesthesia.  Do NOT drive or operate machinery for 24 hours, or while in severe pain.  Avoid making critical decisions or signing legal documents for 24 hours.  Do NOT drink alcoholic beverages for 24 hours and not while taking pain medications.

## 2024-05-31 ENCOUNTER — PRE-ADMISSION TESTING (OUTPATIENT)
Dept: PREADMISSION TESTING | Facility: HOSPITAL | Age: 84
End: 2024-05-31
Payer: MEDICARE

## 2024-05-31 VITALS
DIASTOLIC BLOOD PRESSURE: 70 MMHG | WEIGHT: 212.1 LBS | HEART RATE: 66 BPM | BODY MASS INDEX: 30.37 KG/M2 | RESPIRATION RATE: 16 BRPM | SYSTOLIC BLOOD PRESSURE: 127 MMHG | HEIGHT: 70 IN | OXYGEN SATURATION: 97 %

## 2024-05-31 LAB
ANION GAP SERPL CALCULATED.3IONS-SCNC: 9.5 MMOL/L (ref 5–15)
BUN SERPL-MCNC: 18 MG/DL (ref 8–23)
BUN/CREAT SERPL: 17.6 (ref 7–25)
CALCIUM SPEC-SCNC: 9.3 MG/DL (ref 8.6–10.5)
CHLORIDE SERPL-SCNC: 101 MMOL/L (ref 98–107)
CO2 SERPL-SCNC: 24.5 MMOL/L (ref 22–29)
CREAT SERPL-MCNC: 1.02 MG/DL (ref 0.76–1.27)
DEPRECATED RDW RBC AUTO: 40.5 FL (ref 37–54)
EGFRCR SERPLBLD CKD-EPI 2021: 73.8 ML/MIN/1.73
ERYTHROCYTE [DISTWIDTH] IN BLOOD BY AUTOMATED COUNT: 11.8 % (ref 12.3–15.4)
GLUCOSE SERPL-MCNC: 111 MG/DL (ref 65–99)
HCT VFR BLD AUTO: 40.1 % (ref 37.5–51)
HGB BLD-MCNC: 13.5 G/DL (ref 13–17.7)
MCH RBC QN AUTO: 30.9 PG (ref 26.6–33)
MCHC RBC AUTO-ENTMCNC: 33.7 G/DL (ref 31.5–35.7)
MCV RBC AUTO: 91.8 FL (ref 79–97)
PLATELET # BLD AUTO: 200 10*3/MM3 (ref 140–450)
PMV BLD AUTO: 10.2 FL (ref 6–12)
POTASSIUM SERPL-SCNC: 4.2 MMOL/L (ref 3.5–5.2)
QT INTERVAL: 411 MS
QTC INTERVAL: 408 MS
RBC # BLD AUTO: 4.37 10*6/MM3 (ref 4.14–5.8)
SODIUM SERPL-SCNC: 135 MMOL/L (ref 136–145)
WBC NRBC COR # BLD AUTO: 4.86 10*3/MM3 (ref 3.4–10.8)

## 2024-05-31 PROCEDURE — 93010 ELECTROCARDIOGRAM REPORT: CPT | Performed by: INTERNAL MEDICINE

## 2024-05-31 PROCEDURE — 85027 COMPLETE CBC AUTOMATED: CPT | Performed by: ANESTHESIOLOGY

## 2024-05-31 PROCEDURE — 93005 ELECTROCARDIOGRAM TRACING: CPT

## 2024-05-31 PROCEDURE — 80048 BASIC METABOLIC PNL TOTAL CA: CPT | Performed by: ANESTHESIOLOGY

## 2024-05-31 PROCEDURE — 36415 COLL VENOUS BLD VENIPUNCTURE: CPT

## 2024-05-31 NOTE — DISCHARGE INSTRUCTIONS
PRE-ADMISSION TESTING INSTRUCTIONS FOR ADULTS    Take these medications the morning of surgery with a small sip of water:   gabapentin and verapamil      Do not take any insulin or diabetes medications the morning of surgery.      No aspirin, advil, aleve, ibuprofen, naproxen, diet pills, decongestants, or herbal/vitamins for a week prior to surgery.   Hold meloxicam and b12 as of today.    Tylenol/Acetaminophen is okay to take if needed.    General Instructions:    DO NOT EAT SOLID FOOD AFTER MIDNIGHT THE NIGHT BEFORE SURGERY. No gum, mints, or hard candy after midnight the night before surgery.  You may drink clear liquids the day of surgery up until 2 hours before your arrival time.  (4:00 am)  Clear liquids are liquids you can see through. Nothing RED in color.    Plain water    Sports drinks      Gelatin (Jell-O)  Fruit juices without pulp such as white grape juice and apple juice  Popsicles that contain no fruit or yogurt  Tea or coffee (no cream or milk added)    It is beneficial for you to have a clear drink that contains carbohydrates 2 hours before your arrival time.  We suggest a 20 ounce bottle of Gatorade or Powerade for non-diabetic patients or a 20 ounce bottle of Gatorade Zero or Powerade Zero for diabetic patients.   (4:00 am)    Patients who avoid smoking, chewing tobacco and alcohol for 4 weeks prior to surgery have a reduced risk of post-operative complications.  If at all possible, quit smoking as many days before surgery as you can.    Do not smoke, use chewing tobacco or drink alcohol the day of surgery    Bring your C-PAP/ BI-PAP machine if you use one.  Wear clean comfortable clothes.  Do not wear contact lenses, lotion, deodorant, or make-up.  Bring a case for your glasses if applicable. You may brush your teeth the morning of surgery.  You may wear dentures/partials, do not put adhesive/glue on them.  Leave all other jewelry and valuables at home.      Preventing a Surgical Site  Infection:    Shower the night before and on the morning of surgery using the chlorhexidine soap you were given.  Use a clean washcloth with the soap.  Place clean sheets on your bed after showering the night before surgery. Do not use the CHG soap on your hair, face, or private areas. Wash your body gently for five (5) minutes. Do not scrub your skin.  Dry with a clean towel and dress in clean clothing.  Do not shave the surgical area for 10 days-2 weeks prior to surgery  because the razor can irritate skin and make it easier to develop an infection.  Make sure you, your family, and all healthcare providers clean their hands with soap and water or an alcohol based hand  before caring for you or your wound.      Day of surgery:    Your surgeon’s office will advise you of your arrival time for the day of surgery.    Upon arrival, a Pre-op nurse and Anesthesia provider will review your health history, obtain vital signs, and answer questions you may have. The anesthesia provider will also discuss the type of anesthesia that will be needed for your procedure, which may include general anesthesia. The only belongings needed at this time will be your home medications and if applicable your C-PAP/BI-PAP machine.  If you are staying overnight your family can leave the rest of your belongings in the car and bring them to your room later.  A Pre-op nurse will start an IV and you may receive medication in preparation for surgery, including something to help you relax.  Your family will be able to see you in the Pre-op area.  While you are in surgery your family should notify the waiting room  if they leave the waiting room area and provide a contact phone number.    IF you have any questions, you can call the Pre-Admission Department at [phone] or your surgeon's office.  Notify your surgeon if  you become sick, have a fever, productive cough, or cannot be here the day of surgery    Please be aware  that surgery does come with discomfort.  We want to make every effort to control your discomfort so please discuss any uncontrolled symptoms with your nurse.   Your doctor will most likely have prescribed pain medications.      If you are going home after surgery, you will receive individualized written care instructions before being discharged.  A responsible adult (over the age of 18) must drive you to and from the hospital on the day of your surgery and stay with you for 24 hours after anesthesia.    If you are staying overnight following surgery, you will be transported to your hospital room following the recovery period.  [facility] has all private rooms.    You may receive a survey regarding the care you received. Your feedback is very important and will be used to collect the necessary data to help us to continue to provide excellent care.     Deductibles and co-payments are collected on the day of service. Please be prepared to pay the required co-pay, deductible or deposit on the day of service as defined by your plan.

## 2024-06-02 ENCOUNTER — ANESTHESIA EVENT (OUTPATIENT)
Dept: PERIOP | Facility: HOSPITAL | Age: 84
End: 2024-06-02
Payer: MEDICARE

## 2024-06-03 ENCOUNTER — HOSPITAL ENCOUNTER (OUTPATIENT)
Dept: GENERAL RADIOLOGY | Facility: HOSPITAL | Age: 84
Discharge: HOME | End: 2024-06-03
Payer: MEDICARE

## 2024-06-03 ENCOUNTER — OFFICE VISIT (OUTPATIENT)
Dept: PAIN MEDICINE | Facility: CLINIC | Age: 84
End: 2024-06-03
Payer: MEDICARE

## 2024-06-03 VITALS
WEIGHT: 209.6 LBS | SYSTOLIC BLOOD PRESSURE: 122 MMHG | HEIGHT: 70 IN | DIASTOLIC BLOOD PRESSURE: 66 MMHG | BODY MASS INDEX: 30.01 KG/M2 | HEART RATE: 66 BPM | OXYGEN SATURATION: 96 % | TEMPERATURE: 96.6 F

## 2024-06-03 DIAGNOSIS — Z01.818 PRE-OP TESTING: Primary | ICD-10-CM

## 2024-06-03 DIAGNOSIS — Z98.1 HISTORY OF SPINAL FUSION: ICD-10-CM

## 2024-06-03 DIAGNOSIS — G89.4 CHRONIC PAIN SYNDROME: ICD-10-CM

## 2024-06-03 DIAGNOSIS — M47.816 LUMBAR FACET ARTHROPATHY: ICD-10-CM

## 2024-06-03 DIAGNOSIS — Z01.818 PRE-OP TESTING: ICD-10-CM

## 2024-06-03 DIAGNOSIS — M54.6 THORACIC SPINE PAIN: ICD-10-CM

## 2024-06-03 DIAGNOSIS — M48.062 SPINAL STENOSIS OF LUMBAR REGION WITH NEUROGENIC CLAUDICATION: ICD-10-CM

## 2024-06-03 DIAGNOSIS — M51.36 DDD (DEGENERATIVE DISC DISEASE), LUMBAR: ICD-10-CM

## 2024-06-03 PROCEDURE — 1159F MED LIST DOCD IN RCRD: CPT

## 2024-06-03 PROCEDURE — 1125F AMNT PAIN NOTED PAIN PRSNT: CPT

## 2024-06-03 PROCEDURE — 1160F RVW MEDS BY RX/DR IN RCRD: CPT

## 2024-06-03 PROCEDURE — 71046 X-RAY EXAM CHEST 2 VIEWS: CPT

## 2024-06-03 PROCEDURE — 99214 OFFICE O/P EST MOD 30 MIN: CPT

## 2024-06-03 NOTE — PATIENT INSTRUCTIONS
----------  Education about SCS therapy:    -  This was an extended office visit in which we entered into discussion about advanced pain relieving techniques, and discussed implantable pain therapies.  We discussed advanced neuromodulation in the form of Spinal Cord Stimulation.  This is a reasonable therapy for patients who have exhausted basic nonnarcotic options, basic modalities and physical therapies, and do not have any other reasonable surgical options.  This therapy as an alternative to long term high dose opioid therapy.    -  Risks include but are not limited to bleeding, infection, injury, paralysis, nerve injury, dural puncture, and risk for postprocedural pain.  Implanted equipment risks include but are not limited to lead migration, lead fracture, risk of loss of pain relieving stimulation, risk of electrical shock, and risk of system failure.    - We discussed the theory and basic science behind SCS therapy including but not limited to energy delivery and relevant anatomy, in terms that are easy to understand and also with use of illustrative devices.  Spinal Cord Stimulation therapies apply an electromagnetic field to a specific area on the spinal cord (Dorsal Column) to attempt to block transmission of painful signals from the peripheral nerves to the brain.    -  We discussed that prior to trialing, I request that patients review relevant materials and perform some research, and also have a follow up education session with a device specialist from the .  Also, insurance requires a presurgical psychological evaluation.  When these have been completed, and all the patient's questions have been answered to their satisfaction, then we will plan to request authorization for trialing.   - We discussed the trialing process (aka Phase 1)  that usually lasts a week, and the temporary nature of this trial.  Trial success will determine whether or not we proceed to implant.  We discussed  reasonable expectations, and that I feel that consistent 50% pain relief is medically successful and is a reasonable expectation to justify moving forward to permanent implant.    -  Additional risks of Phase 1 include but are not limited to bleeding on insertion, bleeding on lead removal, and procedural site soreness.  - We discussed the percutaneous surgical implant, including postsurgical restrictions, risks, and alternatives.   For spinal cord stimulation implanted device (aka SCS Phase 2) there is usually a midline vertical incision for the spinally implanted leads, and also a horizontal incision in the posterior lumbar flank for implantation of the battery & computer (aka IPG).  The leads are tunneled from the midline incision to the medial aspect of the battery pocket incision.    -  Postoperative restrictions include limiting the following activity as much as possible for 90 days:  Lifting >10 lbs, bending at the waist, stretching/reaching overhead, and twisting.  ----------     Discussed with the patient that sedation is optional for this procedure.  The sedation offered is called conscious sedation which is different from general anesthesia that is utilized in surgical procedures. The dosing of the sedation is determined by the physician and they will be monitored throughout the procedure. With conscious sedation it is possible to remember parts or all of the procedure, this is normal. They will need to have a  with them as driving is prohibited following conscious sedation.      NPO instructions for conscious sedation:  --- Do not eat 6 hours prior to the procedure.   --- Do not drink any dairy or citrus 4 hours prior to the procedure.   --- Do not drink anything, including clear liquids, 2 hours prior to procedure.      If the NPO instructions are not followed then the procedure may be performed without sedation or the procedure will need to be rescheduled.

## 2024-06-03 NOTE — PROGRESS NOTES
CHIEF COMPLAINT  Back pain     Subjective   Frandy Perkins is a 83 y.o. male  who presents for follow-up.  He has a history of chronic back pain. He presents today to discuss spinal cord stimulator phase 1 trial that is scheduled for tomorrow.    Today pain is 8/10VAS in severity. Pain is located in his low back and radiates into bilateral hips and lower extremities. Describes this pain as a nearly continuous aching and burning. Pain is worsened by prolonged sitting or standing, walking, and bending or twisting. Pain improves with rest/reposition.     He has tried and failed numerous interventional procedures such as caudal epidurals, lumbar MBB/RFA and bilateral L1 transforaminal epidurals.  He recently had an LESI performed by Dr. Brown with minimal relief.    Surgical history:  2017 - Left L5-S1 decompression - Dr. Damon  2016 - L2-L3 posterior lumbar decompression and fusion - Dr. Damon    Back Pain  This is a chronic problem. The current episode started more than 1 year ago. The problem occurs constantly. The problem is unchanged. The pain is present in the lumbar spine. The quality of the pain is described as aching and burning. The pain radiates to the left thigh and right thigh (Bilateral hips). The pain is at a severity of 8/10. The pain is severe. The symptoms are aggravated by lying down, position, standing, sitting, bending and twisting. Associated symptoms include numbness (bilateral foot, bilateral hand) and weakness (bilateral arm, bilateral leg). Pertinent negatives include no abdominal pain, dysuria or fever. He has tried heat and ice (LESI, LMBB/RFA, Gabapentin) for the symptoms. The treatment provided mild relief.      PEG Assessment   What number best describes your pain on average in the past week?7  What number best describes how, during the past week, pain has interfered with your enjoyment of life?7  What number best describes how, during the past week, pain has interfered with your general  "activity?  8    Review of Pertinent Medical Data ---                The following portions of the patient's history were reviewed and updated as appropriate: allergies, current medications, past family history, past medical history, past social history, past surgical history, and problem list.    Review of Systems   Constitutional:  Negative for chills and fever.   Respiratory:  Negative for cough and shortness of breath.    Gastrointestinal:  Negative for abdominal pain, constipation and diarrhea.   Genitourinary:  Negative for difficulty urinating and dysuria.   Musculoskeletal:  Positive for back pain and gait problem.   Neurological:  Positive for weakness (bilateral arm, bilateral leg) and numbness (bilateral foot, bilateral hand). Negative for dizziness and light-headedness.   Psychiatric/Behavioral:  Negative for agitation.      I have reviewed and confirmed the accuracy of the ROS as documented by the MA/LPN/RN BRONSON Weldon    Vitals:    06/03/24 1132   BP: 122/66   BP Location: Left arm   Patient Position: Sitting   Pulse: 66   Temp: 96.6 °F (35.9 °C)   TempSrc: Temporal   SpO2: 96%   Weight: 95.1 kg (209 lb 9.6 oz)   Height: 177.8 cm (70\")   PainSc:   8   PainLoc: Back     Objective   Physical Exam  Constitutional:       Appearance: Normal appearance.   HENT:      Head: Normocephalic.   Cardiovascular:      Rate and Rhythm: Normal rate and regular rhythm.   Pulmonary:      Effort: Pulmonary effort is normal.      Breath sounds: Normal breath sounds.   Musculoskeletal:         General: Normal range of motion.      Cervical back: Normal range of motion.      Lumbar back: Tenderness and bony tenderness present. Decreased range of motion.   Skin:     General: Skin is warm and dry.      Capillary Refill: Capillary refill takes less than 2 seconds.   Neurological:      General: No focal deficit present.      Mental Status: He is alert and oriented to person, place, and time.      Gait: Gait abnormal " (slowed, ambulates with cane and is flexed at the hips).   Psychiatric:         Mood and Affect: Mood normal.         Behavior: Behavior normal.         Thought Content: Thought content normal.         Cognition and Memory: Cognition normal.       Assessment & Plan   Diagnoses and all orders for this visit:    1. Pre-op testing (Primary)  -     XR Chest 2 View; Future    2. History of spinal fusion    3. Spinal stenosis of lumbar region with neurogenic claudication    4. Thoracic spine pain    5. Lumbar facet arthropathy    6. DDD (degenerative disc disease), lumbar    7. Chronic pain syndrome      Frandy Perkins reports a pain score of 8.  Given his pain assessment as noted, treatment options were discussed and the following options were decided upon as a follow-up plan to address the patient's pain: continuation of current treatment plan for pain.    --- Proceed with SCS Phase 1 Implant - scheduled tomorrow 6/4/24 with Dr. Cruz -patient will need Valium 10 mg p.o. prior to procedure.  ----------  Education about SCS therapy:    -  This was an extended office visit in which we entered into discussion about advanced pain relieving techniques, and discussed implantable pain therapies.  We discussed advanced neuromodulation in the form of Spinal Cord Stimulation.  This is a reasonable therapy for patients who have exhausted basic nonnarcotic options, basic modalities and physical therapies, and do not have any other reasonable surgical options.  This therapy as an alternative to long term high dose opioid therapy.    -  Risks include but are not limited to bleeding, infection, injury, paralysis, nerve injury, dural puncture, and risk for postprocedural pain.  Implanted equipment risks include but are not limited to lead migration, lead fracture, risk of loss of pain relieving stimulation, risk of electrical shock, and risk of system failure.    - We discussed the theory and basic science behind SCS therapy  including but not limited to energy delivery and relevant anatomy, in terms that are easy to understand and also with use of illustrative devices.  Spinal Cord Stimulation therapies apply an electromagnetic field to a specific area on the spinal cord (Dorsal Column) to attempt to block transmission of painful signals from the peripheral nerves to the brain.    -  We discussed that prior to trialing, I request that patients review relevant materials and perform some research, and also have a follow up education session with a device specialist from the .  Also, insurance requires a presurgical psychological evaluation.  When these have been completed, and all the patient's questions have been answered to their satisfaction, then we will plan to request authorization for trialing.   - We discussed the trialing process (aka Phase 1)  that usually lasts a week, and the temporary nature of this trial.  Trial success will determine whether or not we proceed to implant.  We discussed reasonable expectations, and that I feel that consistent 50% pain relief is medically successful and is a reasonable expectation to justify moving forward to permanent implant.    -  Additional risks of Phase 1 include but are not limited to bleeding on insertion, bleeding on lead removal, and procedural site soreness.  - We discussed the percutaneous surgical implant, including postsurgical restrictions, risks, and alternatives.   For spinal cord stimulation implanted device (aka SCS Phase 2) there is usually a midline vertical incision for the spinally implanted leads, and also a horizontal incision in the posterior lumbar flank for implantation of the battery & computer (aka IPG).  The leads are tunneled from the midline incision to the medial aspect of the battery pocket incision.    -  Postoperative restrictions include limiting the following activity as much as possible for 90 days:  Lifting >10 lbs, bending at the waist,  stretching/reaching overhead, and twisting.  ----------   -- I feel that this patient is an appropriate candidate for SCS Phase One Trialing.  We plan to use the  [employer]  system.    -- This patient does not have any evidence of sepsis nor coagulopathy.  -- Patient is not a clear candidate for neurosurgical intervention, and has no other reasonable options for basic interventions, injection therapy, and physical therapy.  -- We plan to use the SCS trial to find an appropriate location for lead placement and will plan to reproduce that same placement location in the spine when & if we elect to proceed to implantation SCS Phase 2.    -- Goals are to improve functionality and activity as well as decrease and/or eliminate the need for oral medication management.   Discussed with the patient that sedation is optional for this procedure.  The sedation offered is called conscious sedation which is different from general anesthesia that is utilized in surgical procedures. The dosing of the sedation is determined by the physician and they will be monitored throughout the procedure. With conscious sedation it is possible to remember parts or all of the procedure, this is normal. They will need to have a  with them as driving is prohibited following conscious sedation.      NPO instructions for conscious sedation:  --- Do not eat 6 hours prior to the procedure.   --- Do not drink any dairy or citrus 4 hours prior to the procedure.   --- Do not drink anything, including clear liquids, 2 hours prior to procedure.      If the NPO instructions are not followed then the procedure may be performed without sedation or the procedure will need to be rescheduled.    --- CXR ordered to be performed today for pre-op testing.  All other preop testing has been completed (CBC, CMP, EKG)  --- Psych evaluation completed on 4/19/2024 with Verito Fields DNP.  She was deemed appropriate candidate for spinal cord stimulator.  --- Education  session completed with [employer] Rep   --- Follow-up for procedure - Scheduled on 6/10/2024     MARY REPORT  As the clinician, I personally reviewed the MARY from 6/3/24 while the patient was in the office today.    Dictated utilizing Dragon dictation.

## 2024-06-04 ENCOUNTER — ANESTHESIA (OUTPATIENT)
Dept: PERIOP | Facility: HOSPITAL | Age: 84
End: 2024-06-04
Payer: MEDICARE

## 2024-06-04 ENCOUNTER — HOSPITAL ENCOUNTER (OUTPATIENT)
Facility: HOSPITAL | Age: 84
Setting detail: HOSPITAL OUTPATIENT SURGERY
Discharge: HOME | End: 2024-06-04
Attending: OBSTETRICS & GYNECOLOGY | Admitting: OBSTETRICS & GYNECOLOGY
Payer: MEDICARE

## 2024-06-04 ENCOUNTER — APPOINTMENT (OUTPATIENT)
Dept: GENERAL RADIOLOGY | Facility: HOSPITAL | Age: 84
End: 2024-06-04
Payer: MEDICARE

## 2024-06-04 VITALS
RESPIRATION RATE: 16 BRPM | HEART RATE: 53 BPM | BODY MASS INDEX: 30.07 KG/M2 | WEIGHT: 209.6 LBS | TEMPERATURE: 97.5 F | DIASTOLIC BLOOD PRESSURE: 76 MMHG | SYSTOLIC BLOOD PRESSURE: 124 MMHG | OXYGEN SATURATION: 96 %

## 2024-06-04 DIAGNOSIS — M48.062 SPINAL STENOSIS OF LUMBAR REGION WITH NEUROGENIC CLAUDICATION: ICD-10-CM

## 2024-06-04 DIAGNOSIS — Z98.1 HISTORY OF SPINAL FUSION: ICD-10-CM

## 2024-06-04 PROCEDURE — 77002 NEEDLE LOCALIZATION BY XRAY: CPT

## 2024-06-04 PROCEDURE — 25010000002 CEFAZOLIN PER 500 MG: Performed by: ANESTHESIOLOGY

## 2024-06-04 PROCEDURE — 25010000002 FENTANYL CITRATE (PF) 50 MCG/ML SOLUTION: Performed by: NURSE ANESTHETIST, CERTIFIED REGISTERED

## 2024-06-04 PROCEDURE — 76000 FLUOROSCOPY <1 HR PHYS/QHP: CPT

## 2024-06-04 PROCEDURE — C1897 LEAD, NEUROSTIM TEST KIT: HCPCS

## 2024-06-04 PROCEDURE — 25810000003 LACTATED RINGERS PER 1000 ML

## 2024-06-04 PROCEDURE — C1897 LEAD, NEUROSTIM TEST KIT: HCPCS | Performed by: ANESTHESIOLOGY

## 2024-06-04 PROCEDURE — 63650 IMPLANT NEUROELECTRODES: CPT | Performed by: ANESTHESIOLOGY

## 2024-06-04 DEVICE — TRIAL LEAD KIT, 60CM LENGTH
Type: IMPLANTABLE DEVICE | Site: SPINE THORACIC | Status: FUNCTIONAL
Brand: OCTRODE™

## 2024-06-04 RX ORDER — SODIUM CHLORIDE 9 MG/ML
40 INJECTION, SOLUTION INTRAVENOUS AS NEEDED
Status: DISCONTINUED | OUTPATIENT
Start: 2024-06-04 | End: 2024-06-04 | Stop reason: HOSPADM

## 2024-06-04 RX ORDER — SODIUM CHLORIDE 0.9 % (FLUSH) 0.9 %
10 SYRINGE (ML) INJECTION AS NEEDED
Status: DISCONTINUED | OUTPATIENT
Start: 2024-06-04 | End: 2024-06-04 | Stop reason: HOSPADM

## 2024-06-04 RX ORDER — DEXMEDETOMIDINE HYDROCHLORIDE 100 UG/ML
INJECTION, SOLUTION INTRAVENOUS AS NEEDED
Status: DISCONTINUED | OUTPATIENT
Start: 2024-06-04 | End: 2024-06-04 | Stop reason: SURG

## 2024-06-04 RX ORDER — LIDOCAINE HYDROCHLORIDE 10 MG/ML
0.5 INJECTION, SOLUTION INFILTRATION; PERINEURAL ONCE AS NEEDED
Status: DISCONTINUED | OUTPATIENT
Start: 2024-06-04 | End: 2024-06-04 | Stop reason: HOSPADM

## 2024-06-04 RX ORDER — ONDANSETRON 2 MG/ML
4 INJECTION INTRAMUSCULAR; INTRAVENOUS ONCE AS NEEDED
Status: DISCONTINUED | OUTPATIENT
Start: 2024-06-04 | End: 2024-06-04 | Stop reason: HOSPADM

## 2024-06-04 RX ORDER — DIAZEPAM 5 MG/1
10 TABLET ORAL ONCE
Status: COMPLETED | OUTPATIENT
Start: 2024-06-04 | End: 2024-06-04

## 2024-06-04 RX ORDER — SODIUM CHLORIDE, SODIUM LACTATE, POTASSIUM CHLORIDE, CALCIUM CHLORIDE 600; 310; 30; 20 MG/100ML; MG/100ML; MG/100ML; MG/100ML
100 INJECTION, SOLUTION INTRAVENOUS ONCE
Status: DISCONTINUED | OUTPATIENT
Start: 2024-06-04 | End: 2024-06-04 | Stop reason: HOSPADM

## 2024-06-04 RX ORDER — SODIUM CHLORIDE 0.9 % (FLUSH) 0.9 %
10 SYRINGE (ML) INJECTION EVERY 12 HOURS SCHEDULED
Status: DISCONTINUED | OUTPATIENT
Start: 2024-06-04 | End: 2024-06-04 | Stop reason: HOSPADM

## 2024-06-04 RX ORDER — SODIUM CHLORIDE, SODIUM LACTATE, POTASSIUM CHLORIDE, CALCIUM CHLORIDE 600; 310; 30; 20 MG/100ML; MG/100ML; MG/100ML; MG/100ML
9 INJECTION, SOLUTION INTRAVENOUS CONTINUOUS
Status: DISCONTINUED | OUTPATIENT
Start: 2024-06-04 | End: 2024-06-04 | Stop reason: HOSPADM

## 2024-06-04 RX ORDER — HYDROCODONE BITARTRATE AND ACETAMINOPHEN 5; 325 MG/1; MG/1
1 TABLET ORAL ONCE AS NEEDED
Status: DISCONTINUED | OUTPATIENT
Start: 2024-06-04 | End: 2024-06-04 | Stop reason: HOSPADM

## 2024-06-04 RX ORDER — FENTANYL CITRATE 50 UG/ML
INJECTION, SOLUTION INTRAMUSCULAR; INTRAVENOUS AS NEEDED
Status: DISCONTINUED | OUTPATIENT
Start: 2024-06-04 | End: 2024-06-04 | Stop reason: SURG

## 2024-06-04 RX ORDER — CEPHALEXIN 500 MG/1
500 CAPSULE ORAL 3 TIMES DAILY
Qty: 18 CAPSULE | Refills: 0 | Status: SHIPPED | OUTPATIENT
Start: 2024-06-04 | End: 2024-06-10

## 2024-06-04 RX ADMIN — CEFAZOLIN 2 G: 2 INJECTION, POWDER, FOR SOLUTION INTRAVENOUS at 08:06

## 2024-06-04 RX ADMIN — SODIUM CHLORIDE, POTASSIUM CHLORIDE, SODIUM LACTATE AND CALCIUM CHLORIDE 9 ML/HR: 600; 310; 30; 20 INJECTION, SOLUTION INTRAVENOUS at 06:45

## 2024-06-04 RX ADMIN — DIAZEPAM 10 MG: 5 TABLET ORAL at 06:45

## 2024-06-04 RX ADMIN — DEXMEDETOMIDINE HYDROCHLORIDE 4 MCG: 100 INJECTION, SOLUTION INTRAVENOUS at 08:30

## 2024-06-04 RX ADMIN — FENTANYL CITRATE 25 MCG: 50 INJECTION, SOLUTION INTRAMUSCULAR; INTRAVENOUS at 08:17

## 2024-06-04 RX ADMIN — DEXMEDETOMIDINE HYDROCHLORIDE 8 MCG: 100 INJECTION, SOLUTION INTRAVENOUS at 08:06

## 2024-06-04 RX ADMIN — FENTANYL CITRATE 25 MCG: 50 INJECTION, SOLUTION INTRAMUSCULAR; INTRAVENOUS at 08:05

## 2024-06-04 NOTE — OP NOTE
Spinal Cord Stimulation - Trial of Dorsal Column Stimulator  [facility]    SURGEON:   Lashae Cruz MD    Preop Dx: Chronic low back pain, Lumbar Spinal Stenosis unspecified regarding Neurogenic Claudication, Chronic Pain Syndrome, and Lumbar Radiculopathy  Postop Dx: Same as preop dx    SCS System: Abbott    Operative Findings:  1. Successful Placement of a Trial Dorsal Column Stimulator  2. Bilateral 8 contact leads placed at the Top of the 7th thoracic vertebral body  3. Lead serial numbers: [id number], [id number]      Preprocedure Discussion with Patient:  Risks and complications were discussed with the patient prior to starting the procedure and informed consent was obtained.  We discussed various topics including but not limited to bleeding, infection, injury, allergic reactions, spinal cord and/or neural injury, implant site pain, post procedural site soreness, equipment malfunction including but not limited to lead fracture or lead migration or risk of electrical shock or risk of loss stimulation, risk of the lack of pain relief, and risk of worsening clinical picture.    Reason for procedure:  Chronic pain syndrome.  Non-surgical back pain.  This patient had a favorable psychological profile noted prior to trialing.      Sedation:  Local Anesthetics only and PO valium  Antibiotics:   Cefazolin 2g IV immediately prior to incision    Description of Procedure:    After obtaining informed consent, IV access had been obtained by nursing staff in the preoperative area.  The patient was transported to the operative suite and was placed in a prone position.  Appropriate padding was placed under the patient's abdomen.   Anesthesia monitors were applied to the patient and vitals were noted to be stable throughout the procedure. The skin overlying the back was prepped with 70% isopropanol x 3 and with chloraprep x 2.  Sterile drapes were then applied in usual sterile fashion.  A pre-procedure time-out was  performed to ensure that the correct procedure was being performed on the correct patient.  Antibiotics had been started at an appropriate interval prior to initiation of the procedure.    The T11-12 interspace was identified on  fluoroscopy and a tract of anesthesia was created with 5 mLs of a 1:1 solution of 2% lidocaine and 0.25% bupivacaine with epinephrine 1:100,000 for each of the 2 entry sites.  Next, 14 ga coude needles were inserted through the anesthetized skin tract and were advanced towards the desired interspace.  Using a loss of resistance technique and intermittent projection of fluoroscopy, the epidural space was identified on the first pass.    Next, a 8 contact lead was threaded through each of the needles.  They were advanced under live fluoroscopic guidance to the top of the 7th thoracic vertebral body.  The device representative then performed testing to ensure that they patient developed a paresthesia over their painful distribution.  The device representative was present to confirm proper positioning with fluoroscopic guidance.  After appropriate findings were confirmed, the needles and stylets were removed under live fluoroscopy to ensure that the lead tips did not migrate.    Next, the leads were secured to the skin using mastisol, steri-strips, and sterile dressings to cover the entry sites.  The patient was then placed back in the supine position on the transportation bed and was transported back to the post-operative anesthesia care unit in stable condition with no apparent complications.    The staff pain provider was present throughout the entirety of the procedure.      Estimated Blood Loss: None  Specimens:   None  Complications:  No complications were noted.  Fluids:    100 mL  Drains/Packs:   None    Tolerance and discharge condition:    The patient tolerated the procedure well and was awakened from anesthesia without incident.  The patient was transported to the recovery area  without difficulties.  Further device programming was performed by the spinal cord stimulation device representative in the recovery area.  The patient was again cautioned not to drive at this time and avoid strenuous activities and to avoid reaching overhead and to avoid bending and avoid lifting objects over 5 pounds.  The patient was discharged home under the care of family members in stable and satisfactory condition.      Plan of care:    The patient was given our standard instruction sheet and will resume all medications as per the medication reconciliation sheet.  The patient will return to my office at the appropriate time scheduled in about one business day with the stimulation device representative for further device programming and education about device function if necessary.  Patient will be discharged home and will return to the pain clinic in 5-7 days for lead removal and evaluation of trial efficacy.    The patient understands that there is any signs of complication, bleeding, infection, fever, chills, increasing back pain or spine pain, any neurologic deficit, or any other concerning finding, that the patient of a family member should call my office at [phone] at any time to access the answering service.    INITIAL STIMULATION SETTINGS:  Per device representative     Prescriptions Provided: Keflex 500mg TID #18

## 2024-06-04 NOTE — ANESTHESIA PREPROCEDURE EVALUATION
Anesthesia Evaluation     Patient summary reviewed and Nursing notes reviewed   NPO Solid Status: > 8 hours  NPO Liquid Status: > 8 hours           Airway   Mallampati: III  TM distance: >3 FB  Neck ROM: full  No difficulty expected  Dental    (+) upper dentures and lower dentures    Pulmonary - normal exam   (+) a smoker (quit 35 years ago) Former, cigarettes,  Cardiovascular - normal exam  Exercise tolerance: good (4-7 METS)    NYHA Classification: I  ECG reviewed    (+) hypertension well controlled      Neuro/Psych  (+) headaches (associated with back pain), weakness (BLE), numbness (feet)  GI/Hepatic/Renal/Endo - negative ROS     Musculoskeletal     (+) back pain  Abdominal  - normal exam   Substance History      OB/GYN          Other   arthritis,                   Anesthesia Plan    ASA 3     MAC     intravenous induction     Anesthetic plan, risks, benefits, and alternatives have been provided, discussed and informed consent has been obtained with: patient.  Pre-procedure education provided  Use of blood products discussed with patient  Consented to blood products.    Plan discussed with CRNA.      CODE STATUS:

## 2024-06-04 NOTE — ANESTHESIA POSTPROCEDURE EVALUATION
Patient: Frandy Perkins    Procedure Summary       Date: 06/04/24 Room / Location: [facility] OR  /  [facility] OR    Anesthesia Start: 0801 Anesthesia Stop: 0913    Procedure: SPINAL CORD STIMULATOR INSERTION PHASE 1 (Back) Diagnosis:       History of spinal fusion      Spinal stenosis of lumbar region with neurogenic claudication      (History of spinal fusion [Z98.1])      (Spinal stenosis of lumbar region with neurogenic claudication [M48.062])    Surgeons: Lashae Cruz MD Provider: Mami Law CRNA    Anesthesia Type: MAC ASA Status: 3            Anesthesia Type: MAC    Vitals  Vitals Value Taken Time   /53 06/04/24 0930   Temp 97.5 °F (36.4 °C) 06/04/24 0915   Pulse 52 06/04/24 0939   Resp 16 06/04/24 0915   SpO2 92 % 06/04/24 0939   Vitals shown include unfiled device data.        Post Anesthesia Care and Evaluation    Patient location during evaluation: PHASE II  Patient participation: complete - patient participated  Level of consciousness: awake and alert  Pain score: 0  Pain management: adequate    Airway patency: patent  Anesthetic complications: No anesthetic complications  PONV Status: none  Cardiovascular status: acceptable  Respiratory status: acceptable  Hydration status: acceptable     Author:   Pernell Amaya MD  Internal Medicine, PGY1  [phone]    Patient:  SUSI COOPER  [id number]    Progress Note    Interval events: No acute events. Pt somnolent this morning.   Pertinent ROS (if any):      Administered:  zonisamide: 200 milliGRAM(s) Oral (05-30 @ 21:45)  lithium: 600 milliGRAM(s) Oral (05-30 @ 21:45)  cloZAPine: 150 milliGRAM(s) Oral (05-30 @ 21:45)  fluvoxaMINE: 50 milliGRAM(s) Oral (05-30 @ 21:44)  LORazepam     Tablet: 2 milliGRAM(s) Oral (05-30 @ 21:44)  senna: 2 Tablet(s) Oral (05-30 @ 21:43)  potassium acid phosphate/sodium acid phosphate tablet (K-PHOS No. 2): 1 Tablet(s) Oral (05-30 @ 21:43)        OBJECTIVE:    05-30 @ 07:01  -  05-31 @ 07:00  --------------------------------------------------------  IN: 600 mL / OUT: 1610 mL / NET: -1010 mL      CAPILLARY BLOOD GLUCOSE            VITALS:  T(F): 97.8 (05-31-20 @ 05:16), Max: 98.7 (05-30-20 @ 13:45)  HR: 81 (05-31-20 @ 05:16) (71 - 90)  BP: 95/58 (05-31-20 @ 05:16) (84/54 - 95/58)  RR: 15 (05-31-20 @ 05:16) (15 - 18)  SpO2: 99% (05-31-20 @ 05:16) (97% - 100%)    PHYSICAL EXAM:  GENERAL: NAD, lying in bed comfortably  HEAD:  Atraumatic, Normocephalic  EYES: EOMI, PERRLA, conjunctiva and sclera clear  ENT: Moist mucous membranes  NECK: Supple, No JVD  CHEST/LUNG: Clear to auscultation bilaterally; No rales, rhonchi, wheezing, or rubs. Unlabored respirations  HEART: Regular rate and rhythm; No murmurs, rubs, or gallops  ABDOMEN: Bowel sounds present; Soft, Nontender, Nondistended. No hepatomegaly  EXTREMITIES:  2+ Peripheral Pulses, brisk capillary refill. No clubbing, cyanosis, or edema  NERVOUS SYSTEM:  Alert & Oriented X3, speech clear. No deficits   MSK: FROM all 4 extremities, full and equal strength  SKIN: No rashes or lesions    HOSPITAL MEDICATIONS:  Standing Meds:  cefTRIAXone   IVPB 1000 milliGRAM(s) IV Intermittent every 24 hours  cefTRIAXone   IVPB      cholecalciferol 2000 Unit(s) Oral daily  cloZAPine 150 milliGRAM(s) Oral at bedtime  fluvoxaMINE 50 milliGRAM(s) Oral at bedtime  lithium 300 milliGRAM(s) Oral daily  lithium 600 milliGRAM(s) Oral at bedtime  LORazepam     Tablet 2 milliGRAM(s) Oral at bedtime  potassium acid phosphate/sodium acid phosphate tablet (K-PHOS No. 2) 1 Tablet(s) Oral four times a day with meals  senna 2 Tablet(s) Oral at bedtime  zonisamide 200 milliGRAM(s) Oral at bedtime      PRN Meds:  LORazepam     Tablet 2 milliGRAM(s) Oral every 6 hours PRN  ondansetron Injectable 4 milliGRAM(s) IV Push three times a day PRN      LABS:  CBC 05-31-20 @ 06:20                        11.3   10.50 )-----------( 370                   32.8       Hgb trend: 11.3 <-- , 11.4 <-- , 9.9 <-- , 11.9 <--   WBC trend: 10.50 <-- , 11.18 <-- , 11.69 <-- , 12.97 <--       CMP 05-31-20 @ 06:20    135  |  103  |  9   ----------------------------<  88  3.6   |  20<L>  |  0.72    Ca    9.4      05-31-20 @ 06:20  Phos  3.8     05-31  Mg     2.3     05-31        Serum Cr trend: 0.72 <-- , 0.67 <-- , 0.71 <-- , 0.92 <--         ABG Trend:         MICROBIOLOGY:     Culture - Blood (collected 29 May 2020 12:04)  Source: .Blood Blood-Venous  Preliminary Report (30 May 2020 13:01):    No growth to date.    Culture - Blood (collected 29 May 2020 12:04)  Source: .Blood Blood-Peripheral  Preliminary Report (30 May 2020 13:01):    No growth to date.    Culture - Urine (collected 28 May 2020 16:30)  Source: .Urine Clean Catch (Midstream)  Final Report (30 May 2020 21:20):    >100,000 CFU/ml Enterococcus faecalis    10,000 - 49,000 CFU/mL Coag negative Staphylococcus not Staph    saprophyticus  Organism: Enterococcus faecalis (30 May 2020 21:20)  Organism: Enterococcus faecalis (30 May 2020 21:20)        RADIOLOGY:  [ ] Reviewed and interpreted by me    EKG: Author:   Pernell Amaya MD  Internal Medicine, PGY1  [phone]    Patient:  SUSI COOPER  [id number]    Progress Note    Interval events: No acute events. Pt somnolent this morning but arousable to voice.   Pertinent ROS (if any):      Administered:  zonisamide: 200 milliGRAM(s) Oral (05-30 @ 21:45)  lithium: 600 milliGRAM(s) Oral (05-30 @ 21:45)  cloZAPine: 150 milliGRAM(s) Oral (05-30 @ 21:45)  fluvoxaMINE: 50 milliGRAM(s) Oral (05-30 @ 21:44)  LORazepam     Tablet: 2 milliGRAM(s) Oral (05-30 @ 21:44)  senna: 2 Tablet(s) Oral (05-30 @ 21:43)  potassium acid phosphate/sodium acid phosphate tablet (K-PHOS No. 2): 1 Tablet(s) Oral (05-30 @ 21:43)        OBJECTIVE:    05-30 @ 07:01  -  05-31 @ 07:00  --------------------------------------------------------  IN: 600 mL / OUT: 1610 mL / NET: -1010 mL      CAPILLARY BLOOD GLUCOSE            VITALS:  T(F): 97.8 (05-31-20 @ 05:16), Max: 98.7 (05-30-20 @ 13:45)  HR: 81 (05-31-20 @ 05:16) (71 - 90)  BP: 95/58 (05-31-20 @ 05:16) (84/54 - 95/58)  RR: 15 (05-31-20 @ 05:16) (15 - 18)  SpO2: 99% (05-31-20 @ 05:16) (97% - 100%)    PHYSICAL EXAM:  GENERAL: NAD, somnolent, exam limited  HEAD:  Atraumatic, Normocephalic  EYES: EOMI, PERRLA, conjunctiva and sclera clear  ENT: Moist mucous membranes  NECK: Supple, No JVD  CHEST/LUNG: Clear to auscultation bilaterally; No rales, rhonchi, wheezing, or rubs. Unlabored respirations  HEART: Regular rate and rhythm; No murmurs, rubs, or gallops  ABDOMEN: Bowel sounds present; Soft, +TTP R side  EXTREMITIES:  2+ Peripheral Pulses, brisk capillary refill. No clubbing, cyanosis, or edema  NERVOUS SYSTEM:  Alert & Oriented X1, somnolent  MSK: FROM all 4 extremities, full and equal strength  SKIN: No rashes or lesions    HOSPITAL MEDICATIONS:  Standing Meds:  cefTRIAXone   IVPB 1000 milliGRAM(s) IV Intermittent every 24 hours  cefTRIAXone   IVPB      cholecalciferol 2000 Unit(s) Oral daily  cloZAPine 150 milliGRAM(s) Oral at bedtime  fluvoxaMINE 50 milliGRAM(s) Oral at bedtime  lithium 300 milliGRAM(s) Oral daily  lithium 600 milliGRAM(s) Oral at bedtime  LORazepam     Tablet 2 milliGRAM(s) Oral at bedtime  potassium acid phosphate/sodium acid phosphate tablet (K-PHOS No. 2) 1 Tablet(s) Oral four times a day with meals  senna 2 Tablet(s) Oral at bedtime  zonisamide 200 milliGRAM(s) Oral at bedtime      PRN Meds:  LORazepam     Tablet 2 milliGRAM(s) Oral every 6 hours PRN  ondansetron Injectable 4 milliGRAM(s) IV Push three times a day PRN      LABS:  CBC 05-31-20 @ 06:20                        11.3   10.50 )-----------( 370                   32.8       Hgb trend: 11.3 <-- , 11.4 <-- , 9.9 <-- , 11.9 <--   WBC trend: 10.50 <-- , 11.18 <-- , 11.69 <-- , 12.97 <--       CMP 05-31-20 @ 06:20    135  |  103  |  9   ----------------------------<  88  3.6   |  20<L>  |  0.72    Ca    9.4      05-31-20 @ 06:20  Phos  3.8     05-31  Mg     2.3     05-31        Serum Cr trend: 0.72 <-- , 0.67 <-- , 0.71 <-- , 0.92 <--         ABG Trend:         MICROBIOLOGY:     Culture - Blood (collected 29 May 2020 12:04)  Source: .Blood Blood-Venous  Preliminary Report (30 May 2020 13:01):    No growth to date.    Culture - Blood (collected 29 May 2020 12:04)  Source: .Blood Blood-Peripheral  Preliminary Report (30 May 2020 13:01):    No growth to date.    Culture - Urine (collected 28 May 2020 16:30)  Source: .Urine Clean Catch (Midstream)  Final Report (30 May 2020 21:20):    >100,000 CFU/ml Enterococcus faecalis    10,000 - 49,000 CFU/mL Coag negative Staphylococcus not Staph    saprophyticus  Organism: Enterococcus faecalis (30 May 2020 21:20)  Organism: Enterococcus faecalis (30 May 2020 21:20)        RADIOLOGY:  [ ] Reviewed and interpreted by me    EKG:

## 2024-06-10 ENCOUNTER — OFFICE VISIT (OUTPATIENT)
Dept: PAIN MEDICINE | Facility: CLINIC | Age: 84
End: 2024-06-10
Payer: MEDICARE

## 2024-06-10 VITALS
TEMPERATURE: 96.9 F | SYSTOLIC BLOOD PRESSURE: 100 MMHG | WEIGHT: 211.4 LBS | DIASTOLIC BLOOD PRESSURE: 68 MMHG | BODY MASS INDEX: 30.26 KG/M2 | HEART RATE: 73 BPM | HEIGHT: 70 IN | RESPIRATION RATE: 18 BRPM | OXYGEN SATURATION: 96 %

## 2024-06-10 DIAGNOSIS — M51.36 DDD (DEGENERATIVE DISC DISEASE), LUMBAR: ICD-10-CM

## 2024-06-10 DIAGNOSIS — M48.062 SPINAL STENOSIS OF LUMBAR REGION WITH NEUROGENIC CLAUDICATION: ICD-10-CM

## 2024-06-10 DIAGNOSIS — M54.6 THORACIC SPINE PAIN: ICD-10-CM

## 2024-06-10 DIAGNOSIS — G89.4 CHRONIC PAIN SYNDROME: ICD-10-CM

## 2024-06-10 DIAGNOSIS — Z98.1 HISTORY OF SPINAL FUSION: Primary | ICD-10-CM

## 2024-06-10 DIAGNOSIS — M47.816 LUMBAR FACET ARTHROPATHY: ICD-10-CM

## 2024-06-10 PROCEDURE — 1160F RVW MEDS BY RX/DR IN RCRD: CPT

## 2024-06-10 PROCEDURE — 1159F MED LIST DOCD IN RCRD: CPT

## 2024-06-10 PROCEDURE — 99024 POSTOP FOLLOW-UP VISIT: CPT

## 2024-06-10 PROCEDURE — 1125F AMNT PAIN NOTED PAIN PRSNT: CPT

## 2024-06-10 NOTE — PROGRESS NOTES
CHIEF COMPLAINT  Back pain    Subjective   Frandy Perkins is a 83 y.o. male  who presents for follow-up.  He has a history of chronic back pain. He presents today for follow up of SCS trial that was performed by Dr. Cruz on 6/4/24.     Plan was to remove SCS leads during office visit. Patient notes some improvement during trial but SCS was reprogrammed by [facility] during visit to allow for optimal pain relief.  Dr. Cruz agreed to extend the trial 1 more day.  Patient will follow-up in [place] tomorrow for lead pull and to assess effectiveness of reprogramming.    Back Pain  This is a chronic problem. The current episode started more than 1 year ago. The problem occurs constantly. The problem has been comes and goes since onset. The pain is present in the lumbar spine. The quality of the pain is described as aching and burning. The pain radiates to the left thigh and right thigh (Bilateral hips). The pain is at a severity of 4/10. The pain is severe. The symptoms are aggravated by lying down, position, standing, sitting, bending and twisting. Associated symptoms include weakness. Pertinent negatives include no abdominal pain, dysuria, fever or numbness. He has tried heat and ice (LESI, LMBB/RFA, Gabapentin) for the symptoms. The treatment provided mild relief.      PEG Assessment   What number best describes your pain on average in the past week?6  What number best describes how, during the past week, pain has interfered with your enjoyment of life?7  What number best describes how, during the past week, pain has interfered with your general activity?  7    Review of Pertinent Medical Data ---        The following portions of the patient's history were reviewed and updated as appropriate: allergies, current medications, past family history, past medical history, past social history, past surgical history, and problem list.    Review of Systems   Constitutional:  Negative for fever.   Gastrointestinal:   "Positive for constipation. Negative for abdominal pain and diarrhea.   Genitourinary:  Negative for difficulty urinating and dysuria.   Musculoskeletal:  Positive for back pain.   Neurological:  Positive for weakness. Negative for numbness.   Psychiatric/Behavioral:  Negative for sleep disturbance and suicidal ideas. The patient is not nervous/anxious.      I have reviewed and confirmed the accuracy of the ROS as documented by the MA/LPN/RN BRONSON Weldon    Vitals:    06/10/24 1127   BP: 100/68   Pulse: 73   Resp: 18   Temp: 96.9 °F (36.1 °C)   SpO2: 96%   Weight: 95.9 kg (211 lb 6.4 oz)   Height: 177.8 cm (70\")   PainSc:   4   PainLoc: Back     Objective   Physical Exam  Constitutional:       Appearance: Normal appearance.   HENT:      Head: Normocephalic.   Cardiovascular:      Rate and Rhythm: Normal rate and regular rhythm.   Pulmonary:      Effort: Pulmonary effort is normal.      Breath sounds: Normal breath sounds.   Musculoskeletal:         General: Normal range of motion.      Cervical back: Normal range of motion.      Lumbar back: Tenderness and bony tenderness present. Decreased range of motion.      Comments:      Skin:     General: Skin is warm and dry.      Capillary Refill: Capillary refill takes less than 2 seconds.   Neurological:      General: No focal deficit present.      Mental Status: He is alert and oriented to person, place, and time.      Gait: Gait abnormal (slowed, ambulates with cane and is flexed at the hips).   Psychiatric:         Mood and Affect: Mood normal.         Behavior: Behavior normal.         Thought Content: Thought content normal.         Cognition and Memory: Cognition normal.       Assessment & Plan   Diagnoses and all orders for this visit:    1. History of spinal fusion (Primary)    2. Spinal stenosis of lumbar region with neurogenic claudication    3. Thoracic spine pain    4. Lumbar facet arthropathy    5. DDD (degenerative disc disease), lumbar    6. " Chronic pain syndrome      Frandy Perkins reports a pain score of 4.  Given his pain assessment as noted, treatment options were discussed and the following options were decided upon as a follow-up plan to address the patient's pain: continuation of current treatment plan for pain.    --- SCS reprogrammed by [facility] during office visit. I was not involved in any aspect of the reprogramming process. Dr. Cruz agreed to extend SCS trial by one more day. Patient will be scheduled for a post-op office visit/lead pull tomorrow at our [place] office. I will assess the effectiveness of reprogramming at that time.     MARY REPORT  As the clinician, I personally reviewed the MARY from 6/10/24 while the patient was in the office today.    Dictated utilizing Dragon dictation.

## 2024-06-11 ENCOUNTER — OFFICE VISIT (OUTPATIENT)
Age: 84
End: 2024-06-11
Payer: MEDICARE

## 2024-06-11 VITALS
HEIGHT: 70 IN | BODY MASS INDEX: 30.26 KG/M2 | HEART RATE: 64 BPM | DIASTOLIC BLOOD PRESSURE: 70 MMHG | TEMPERATURE: 97.7 F | WEIGHT: 211.4 LBS | SYSTOLIC BLOOD PRESSURE: 130 MMHG | OXYGEN SATURATION: 97 %

## 2024-06-11 DIAGNOSIS — G89.4 CHRONIC PAIN SYNDROME: ICD-10-CM

## 2024-06-11 DIAGNOSIS — M48.062 SPINAL STENOSIS OF LUMBAR REGION WITH NEUROGENIC CLAUDICATION: ICD-10-CM

## 2024-06-11 DIAGNOSIS — Z98.1 HISTORY OF SPINAL FUSION: Primary | ICD-10-CM

## 2024-06-11 DIAGNOSIS — M54.6 THORACIC SPINE PAIN: ICD-10-CM

## 2024-06-11 DIAGNOSIS — M47.816 LUMBAR FACET ARTHROPATHY: ICD-10-CM

## 2024-06-11 DIAGNOSIS — M51.36 DDD (DEGENERATIVE DISC DISEASE), LUMBAR: ICD-10-CM

## 2024-06-11 RX ORDER — HYDROCODONE BITARTRATE AND ACETAMINOPHEN 5; 325 MG/1; MG/1
1 TABLET ORAL EVERY 4 HOURS PRN
Qty: 18 TABLET | Refills: 0 | Status: SHIPPED | OUTPATIENT
Start: 2024-06-11

## 2024-06-11 NOTE — PROGRESS NOTES
CHIEF COMPLAINT  Back pain    Subjective   Frandy Perkins is a 83 y.o. male  who presents to the office for follow-up of procedure.  He completed a SCS Phase 1 triral from 6/4/24 - 6/11/24 performed by Dr. Cruz for management of low back pain.    This patient presents today for in-office removal of spinal cord stimulation trialing leads.  The dressings were removed and the leads were exposed.  There was no indication of infection or irritation.  The leads were easily pulled from the epidural space and removed.  Lead tips were intact.     The patient report pain relief of at least 25% despite being reprogrammed in our office yesterday and extending the trial by 1 more day.  He states that he is planning to go out of town this weekend and would like to see how his pain does while riding in the car for an extended period of time.  He states that this will help determine if he received more relief from the stimulator trial than originally thought.    Procedures:  6/4/24 - 6/11/24 - SCS Phase 1 Trial - [employer] [facility]    Back Pain  This is a chronic problem. The current episode started more than 1 year ago. The problem occurs constantly. The problem has been comes and goes since onset. The pain is present in the lumbar spine. The quality of the pain is described as aching and burning. The pain radiates to the left thigh and right thigh (Bilateral hips). The pain is at a severity of 4/10. The pain is severe. The symptoms are aggravated by lying down, position, standing, sitting, bending and twisting. Associated symptoms include weakness. Pertinent negatives include no abdominal pain, chest pain, dysuria, fever, headaches or numbness. He has tried heat and ice (LESI, LMBB/RFA, Gabapentin) for the symptoms. The treatment provided mild relief.     PEG Assessment   What number best describes your pain on average in the past week?7  What number best describes how, during the past week, pain has interfered with your enjoyment  "of life?7  What number best describes how, during the past week, pain has interfered with your general activity?  8    Review of Pertinent Medical Data ---        The following portions of the patient's history were reviewed and updated as appropriate: allergies, current medications, past family history, past medical history, past social history, past surgical history, and problem list.    Review of Systems   Constitutional:  Positive for activity change (decreased). Negative for chills, fatigue and fever.   HENT:  Negative for congestion.    Eyes:  Negative for visual disturbance.   Respiratory:  Negative for chest tightness and shortness of breath.    Cardiovascular:  Negative for chest pain.   Gastrointestinal:  Positive for constipation. Negative for abdominal pain and diarrhea.   Genitourinary:  Negative for difficulty urinating and dysuria.   Musculoskeletal:  Positive for back pain.   Neurological:  Positive for weakness. Negative for dizziness, light-headedness, numbness and headaches.   Psychiatric/Behavioral:  Negative for agitation and sleep disturbance. The patient is not nervous/anxious.        I have reviewed and confirmed the accuracy of the ROS as documented by the MA/LPN/RN BRONSON Weldon     Vitals:    06/11/24 0935   BP: 130/70   Pulse: 64   Temp: 97.7 °F (36.5 °C)   SpO2: 97%   Weight: 95.9 kg (211 lb 6.4 oz)   Height: 177.8 cm (70\")   PainSc:   6   PainLoc: Back  Comment: and mery hips     Objective   Physical Exam  Constitutional:       Appearance: Normal appearance.   HENT:      Head: Normocephalic.   Cardiovascular:      Rate and Rhythm: Normal rate and regular rhythm.   Pulmonary:      Effort: Pulmonary effort is normal.      Breath sounds: Normal breath sounds.   Musculoskeletal:         General: Normal range of motion.      Cervical back: Normal range of motion.      Lumbar back: Tenderness and bony tenderness present. Decreased range of motion.      Comments: Dressing C/D/I. Two " leads in place. Dressing removed. Two puncture sites consistent with stimulator trial leads. Leads removed. Tips intact. Area covered with two small band-aids.     Skin:     General: Skin is warm and dry.      Capillary Refill: Capillary refill takes less than 2 seconds.   Neurological:      General: No focal deficit present.      Mental Status: He is alert and oriented to person, place, and time.      Gait: Gait abnormal (slowed, ambulates with cane and is flexed at the hips).   Psychiatric:         Mood and Affect: Mood normal.         Behavior: Behavior normal.         Thought Content: Thought content normal.         Cognition and Memory: Cognition normal.       Assessment & Plan   Diagnoses and all orders for this visit:    1. History of spinal fusion (Primary)    2. Spinal stenosis of lumbar region with neurogenic claudication    3. Thoracic spine pain    4. Lumbar facet arthropathy  -     HYDROcodone-acetaminophen (NORCO) 5-325 MG per tablet; Take 1 tablet by mouth Every 4 (Four) Hours As Needed for Severe Pain.  Dispense: 18 tablet; Refill: 0    5. DDD (degenerative disc disease), lumbar  -     HYDROcodone-acetaminophen (NORCO) 5-325 MG per tablet; Take 1 tablet by mouth Every 4 (Four) Hours As Needed for Severe Pain.  Dispense: 18 tablet; Refill: 0    6. Chronic pain syndrome  -     HYDROcodone-acetaminophen (NORCO) 5-325 MG per tablet; Take 1 tablet by mouth Every 4 (Four) Hours As Needed for Severe Pain.  Dispense: 18 tablet; Refill: 0      Frandy Perkins reports a pain score of 6.  Given his pain assessment as noted, treatment options were discussed and the following options were decided upon as a follow-up plan to address the patient's pain: continuation of current treatment plan for pain and prescription for opiod analgesics.    --- Patient would like to see how he does without the spinal cord stimulator in place over the next week as he will be traveling in a car for an extended period of time.  He  states that he will contact our office if he feels that the spinal cord stimulator may have been more beneficial than he initially thought.   --- Acute supply of Hydrcodone 5mg to be utilized when in severe pain. Patient is going to [place] this weekend and would like to have something on hand for flares in pain.   --- Follow-up as needed      MARY REPORT  As part of the patient's treatment plan, I am prescribing controlled substances. The patient has been made aware of appropriate use of such medications, including potential risk of somnolence, limited ability to drive and/or work safely, and the potential for dependence or overdose. It has also been made clear that these medications are for use by this patient only, without concomitant use of alcohol or other substances unless prescribed.     Patient has completed prescribing agreement detailing terms of continued prescribing of controlled substances, including monitoring MARY reports, urine drug screening, and pill counts if necessary. The patient is aware that inappropriate use will results in cessation of prescribing such medications.    As the clinician, I personally reviewed the MARY from 6/11/24 while the patient was in the office today.    History and physical exam exhibit continued safe and appropriate use of controlled substances.    Dictated utilizing Dragon dictation.

## (undated) DEVICE — 3M™ TEGADERM™ I.V. ADVANCED SECUREMENT DRESSING, 1688, 4 IN X 4-3/4 IN, 10 CM X 12 CM, 50/CT 4CT/CASE: Brand: 3M™ TEGADERM™

## (undated) DEVICE — PAD GRND E/S NT200IX RF/GEN W/CABL DISP

## (undated) DEVICE — NDL SPINE 22G 31/2IN BLK

## (undated) DEVICE — EPIDURAL TRAY: Brand: MEDLINE INDUSTRIES, INC.

## (undated) DEVICE — GLV SURG TRIUMPH PF LTX 7.5 STRL

## (undated) DEVICE — Device: Brand: PORTEX

## (undated) DEVICE — PREMIUM WET SKIN PREP TRAY CHG: Brand: MEDLINE INDUSTRIES, INC.

## (undated) DEVICE — APPL CHLORAPREP HI/LITE 26ML ORNG

## (undated) DEVICE — DRAPE,CHEST,FENES,15X10,STERIL: Brand: MEDLINE

## (undated) DEVICE — NDL SPINE 25G 31/2IN BLU

## (undated) DEVICE — GLV SURG TRIUMPH PF LTX 7 STRL

## (undated) DEVICE — Device

## (undated) DEVICE — CONTAINER,SPECIMEN,OR STERILE,4OZ: Brand: MEDLINE

## (undated) DEVICE — CABL EXT SPINE TRIAL M/LD 3013

## (undated) DEVICE — ADHS LIQ MASTISOL 2/3ML

## (undated) DEVICE — LAG MINOR PROCEDURE: Brand: MEDLINE INDUSTRIES, INC.

## (undated) DEVICE — TOWEL,OR,DSP,ST,BLUE,STD,4/PK,20PK/CS: Brand: MEDLINE

## (undated) DEVICE — GLV SURG SENSICARE PI LF PF 7.0

## (undated) DEVICE — NDL SPINE 22G 5IN BLK

## (undated) DEVICE — DRP C/ARM 41X74IN

## (undated) DEVICE — KT HDR TRIAL 2 PRT W/ EXT PULS GEN

## (undated) DEVICE — DRSNG SURESITE123 6X8IN

## (undated) DEVICE — NDL EPI COUDE RX 14G 4IN

## (undated) DEVICE — STRIP,CLOSURE,WOUND,MEDI-STRIP,1/2X4: Brand: MEDLINE

## (undated) DEVICE — 3M™ IOBAN™ 2 ANTIMICROBIAL INCISE DRAPE 6650EZ: Brand: IOBAN™ 2